# Patient Record
Sex: FEMALE | Race: WHITE | Employment: FULL TIME | URBAN - METROPOLITAN AREA
[De-identification: names, ages, dates, MRNs, and addresses within clinical notes are randomized per-mention and may not be internally consistent; named-entity substitution may affect disease eponyms.]

---

## 2017-02-28 ENCOUNTER — OFFICE VISIT (OUTPATIENT)
Dept: DERMATOLOGY | Age: 47
End: 2017-02-28

## 2017-02-28 DIAGNOSIS — D22.9 MULTIPLE NEVI: ICD-10-CM

## 2017-02-28 DIAGNOSIS — Z86.018 HISTORY OF DYSPLASTIC NEVUS: ICD-10-CM

## 2017-02-28 DIAGNOSIS — Z85.820 HISTORY OF MELANOMA: Primary | ICD-10-CM

## 2017-02-28 PROCEDURE — 99213 OFFICE O/P EST LOW 20 MIN: CPT | Performed by: DERMATOLOGY

## 2017-05-09 ENCOUNTER — EMPLOYEE WELLNESS (OUTPATIENT)
Dept: OTHER | Age: 47
End: 2017-05-09

## 2017-05-09 LAB
CHOLESTEROL, TOTAL: 202 MG/DL (ref 0–199)
GLUCOSE BLD-MCNC: 76 MG/DL (ref 70–99)
HDLC SERPL-MCNC: 57 MG/DL (ref 40–60)
LDL CHOLESTEROL CALCULATED: 129 MG/DL
TRIGL SERPL-MCNC: 78 MG/DL (ref 0–150)

## 2017-05-22 LAB
HPV COMMENT: NORMAL
HPV TYPE 16: NOT DETECTED
HPV TYPE 18: NOT DETECTED
HPVOH (OTHER TYPES): NOT DETECTED

## 2017-06-13 ENCOUNTER — HOSPITAL ENCOUNTER (OUTPATIENT)
Dept: MAMMOGRAPHY | Age: 47
Discharge: HOME | End: 2017-06-13
Attending: OBSTETRICS & GYNECOLOGY | Admitting: OBSTETRICS & GYNECOLOGY

## 2017-06-13 DIAGNOSIS — Z12.31 ENCOUNTER FOR SCREENING MAMMOGRAM FOR BREAST CANCER: ICD-10-CM

## 2017-06-26 ENCOUNTER — OFFICE VISIT (OUTPATIENT)
Dept: FAMILY MEDICINE CLINIC | Age: 47
End: 2017-06-26

## 2017-06-26 VITALS
HEIGHT: 62 IN | SYSTOLIC BLOOD PRESSURE: 112 MMHG | OXYGEN SATURATION: 98 % | WEIGHT: 176 LBS | HEART RATE: 64 BPM | DIASTOLIC BLOOD PRESSURE: 78 MMHG | BODY MASS INDEX: 32.39 KG/M2

## 2017-06-26 DIAGNOSIS — E04.1 THYROID NODULE: ICD-10-CM

## 2017-06-26 DIAGNOSIS — Z00.00 HEALTHCARE MAINTENANCE: Primary | ICD-10-CM

## 2017-06-26 PROCEDURE — 99396 PREV VISIT EST AGE 40-64: CPT | Performed by: FAMILY MEDICINE

## 2018-01-04 ENCOUNTER — OFFICE VISIT (OUTPATIENT)
Dept: FAMILY MEDICINE CLINIC | Age: 48
End: 2018-01-04

## 2018-01-04 VITALS
DIASTOLIC BLOOD PRESSURE: 80 MMHG | WEIGHT: 179.6 LBS | HEART RATE: 89 BPM | SYSTOLIC BLOOD PRESSURE: 130 MMHG | OXYGEN SATURATION: 97 % | HEIGHT: 63 IN | BODY MASS INDEX: 31.82 KG/M2

## 2018-01-04 DIAGNOSIS — E04.1 THYROID NODULE: ICD-10-CM

## 2018-01-04 DIAGNOSIS — Z00.00 PREVENTATIVE HEALTH CARE: Primary | ICD-10-CM

## 2018-01-04 DIAGNOSIS — R60.0 LOWER EXTREMITY EDEMA: ICD-10-CM

## 2018-01-04 PROCEDURE — 99396 PREV VISIT EST AGE 40-64: CPT | Performed by: FAMILY MEDICINE

## 2018-01-04 ASSESSMENT — PATIENT HEALTH QUESTIONNAIRE - PHQ9
SUM OF ALL RESPONSES TO PHQ9 QUESTIONS 1 & 2: 0
1. LITTLE INTEREST OR PLEASURE IN DOING THINGS: 0
SUM OF ALL RESPONSES TO PHQ QUESTIONS 1-9: 0
2. FEELING DOWN, DEPRESSED OR HOPELESS: 0

## 2018-01-04 ASSESSMENT — ENCOUNTER SYMPTOMS
CONSTIPATION: 0
DIARRHEA: 1
COUGH: 0
SHORTNESS OF BREATH: 0
BACK PAIN: 0
TROUBLE SWALLOWING: 0
CHEST TIGHTNESS: 0

## 2018-01-04 NOTE — PROGRESS NOTES
better in morning). Negative for chest pain. Gastrointestinal: Positive for diarrhea (occasional; see above). Negative for constipation. Abdominal pain: with stomach upset/diarrhea. Intermittent. Genitourinary: Negative for difficulty urinating and menstrual problem. Musculoskeletal: Negative for arthralgias and back pain. Neurological: Positive for headaches (occasional; has had some headache evaluation in the past with more severe migraine). Psychiatric/Behavioral: Negative for sleep disturbance. The patient is not nervous/anxious. Objective:    /80   Pulse 89   Ht 5' 2.5\" (1.588 m)   Wt 179 lb 9.6 oz (81.5 kg)   SpO2 97%   BMI 32.33 kg/m²   Weight: 179 lb 9.6 oz (81.5 kg)     BP Readings from Last 3 Encounters:   01/04/18 130/80   06/26/17 112/78   10/17/16 110/74     Wt Readings from Last 3 Encounters:   01/04/18 179 lb 9.6 oz (81.5 kg)   06/26/17 176 lb (79.8 kg)   10/17/16 170 lb (77.1 kg)       Physical Exam   Constitutional: She is oriented to person, place, and time. She appears well-developed and well-nourished. No distress. HENT:   Head: Normocephalic and atraumatic. Mouth/Throat: Posterior oropharyngeal edema and posterior oropharyngeal erythema present. Heavy cobblestoning   Eyes: Conjunctivae and EOM are normal. Pupils are equal, round, and reactive to light. Neck: Normal range of motion. Neck supple. Cardiovascular: Normal rate, regular rhythm and normal heart sounds. Pulmonary/Chest: Effort normal and breath sounds normal.   Abdominal: Soft. Bowel sounds are normal.   Musculoskeletal: Normal range of motion. Neurological: She is alert and oriented to person, place, and time. She has normal reflexes. Skin: Skin is warm and dry. No rash noted. She is not diaphoretic. Psychiatric: She has a normal mood and affect. Assessment/Plan:    1. Preventative health care  She is up to date with preventative care.  We discussed dietary changes; she has been

## 2018-01-04 NOTE — PATIENT INSTRUCTIONS
Patient Education        DASH Diet: Care Instructions  Your Care Instructions    The DASH diet is an eating plan that can help lower your blood pressure. DASH stands for Dietary Approaches to Stop Hypertension. Hypertension is high blood pressure. The DASH diet focuses on eating foods that are high in calcium, potassium, and magnesium. These nutrients can lower blood pressure. The foods that are highest in these nutrients are fruits, vegetables, low-fat dairy products, nuts, seeds, and legumes. But taking calcium, potassium, and magnesium supplements instead of eating foods that are high in those nutrients does not have the same effect. The DASH diet also includes whole grains, fish, and poultry. The DASH diet is one of several lifestyle changes your doctor may recommend to lower your high blood pressure. Your doctor may also want you to decrease the amount of sodium in your diet. Lowering sodium while following the DASH diet can lower blood pressure even further than just the DASH diet alone. Follow-up care is a key part of your treatment and safety. Be sure to make and go to all appointments, and call your doctor if you are having problems. It's also a good idea to know your test results and keep a list of the medicines you take. How can you care for yourself at home? Following the DASH diet  · Eat 4 to 5 servings of fruit each day. A serving is 1 medium-sized piece of fruit, ½ cup chopped or canned fruit, 1/4 cup dried fruit, or 4 ounces (½ cup) of fruit juice. Choose fruit more often than fruit juice. · Eat 4 to 5 servings of vegetables each day. A serving is 1 cup of lettuce or raw leafy vegetables, ½ cup of chopped or cooked vegetables, or 4 ounces (½ cup) of vegetable juice. Choose vegetables more often than vegetable juice. · Get 2 to 3 servings of low-fat and fat-free dairy each day. A serving is 8 ounces of milk, 1 cup of yogurt, or 1 ½ ounces of cheese. · Eat 6 to 8 servings of grains each day.

## 2018-02-27 ENCOUNTER — HOSPITAL ENCOUNTER (OUTPATIENT)
Dept: ULTRASOUND IMAGING | Age: 48
Discharge: HOME | End: 2018-02-27
Attending: FAMILY MEDICINE | Admitting: FAMILY MEDICINE

## 2018-02-27 DIAGNOSIS — E04.1 THYROID NODULE: ICD-10-CM

## 2018-02-27 DIAGNOSIS — E04.1 NONTOXIC SINGLE THYROID NODULE: ICD-10-CM

## 2018-03-05 ENCOUNTER — OFFICE VISIT (OUTPATIENT)
Dept: DERMATOLOGY | Age: 48
End: 2018-03-05

## 2018-03-05 DIAGNOSIS — Z86.018 HISTORY OF DYSPLASTIC NEVUS: ICD-10-CM

## 2018-03-05 DIAGNOSIS — D22.9 MULTIPLE NEVI: ICD-10-CM

## 2018-03-05 DIAGNOSIS — D23.39 FIBROUS PAPULE OF CHEEK: ICD-10-CM

## 2018-03-05 DIAGNOSIS — Z85.820 HISTORY OF MALIGNANT MELANOMA: Primary | ICD-10-CM

## 2018-03-05 PROCEDURE — 99213 OFFICE O/P EST LOW 20 MIN: CPT | Performed by: DERMATOLOGY

## 2018-03-20 VITALS — BODY MASS INDEX: 32.37 KG/M2 | WEIGHT: 177 LBS

## 2018-04-04 LAB
CHOLESTEROL, TOTAL: 216 MG/DL (ref 0–199)
GLUCOSE BLD-MCNC: 79 MG/DL (ref 70–99)
HDLC SERPL-MCNC: 58 MG/DL (ref 40–60)
LDL CHOLESTEROL CALCULATED: 139 MG/DL
TRIGL SERPL-MCNC: 97 MG/DL (ref 0–150)

## 2018-04-05 ENCOUNTER — EMPLOYEE WELLNESS (OUTPATIENT)
Dept: OTHER | Age: 48
End: 2018-04-05

## 2018-04-10 VITALS — WEIGHT: 184 LBS | BODY MASS INDEX: 33.12 KG/M2

## 2019-02-26 ENCOUNTER — HOSPITAL ENCOUNTER (OUTPATIENT)
Dept: WOMENS IMAGING | Age: 49
Discharge: HOME | End: 2019-02-26
Payer: COMMERCIAL

## 2019-02-26 DIAGNOSIS — Z12.39 BREAST CANCER SCREENING: ICD-10-CM

## 2019-02-26 PROCEDURE — 77063 BREAST TOMOSYNTHESIS BI: CPT

## 2019-03-05 ENCOUNTER — OFFICE VISIT (OUTPATIENT)
Dept: DERMATOLOGY | Age: 49
End: 2019-03-05
Payer: COMMERCIAL

## 2019-03-05 DIAGNOSIS — Z85.820 HISTORY OF MALIGNANT MELANOMA: Primary | ICD-10-CM

## 2019-03-05 DIAGNOSIS — D22.9 MULTIPLE NEVI: ICD-10-CM

## 2019-03-05 DIAGNOSIS — D48.5 NEOPLASM OF UNCERTAIN BEHAVIOR OF SKIN: ICD-10-CM

## 2019-03-05 DIAGNOSIS — Z86.018 HISTORY OF DYSPLASTIC NEVUS: ICD-10-CM

## 2019-03-05 PROCEDURE — 99213 OFFICE O/P EST LOW 20 MIN: CPT | Performed by: DERMATOLOGY

## 2019-03-05 PROCEDURE — 11102 TANGNTL BX SKIN SINGLE LES: CPT | Performed by: DERMATOLOGY

## 2019-03-05 RX ORDER — CHLORAL HYDRATE 500 MG
3000 CAPSULE ORAL 3 TIMES DAILY
COMMUNITY

## 2019-03-05 RX ORDER — CALCIUM CARBONATE 500(1250)
500 TABLET ORAL DAILY
COMMUNITY

## 2019-03-05 RX ORDER — CHOLECALCIFEROL (VITAMIN D3) 125 MCG
CAPSULE ORAL
COMMUNITY

## 2019-03-07 LAB — DERMATOLOGY PATHOLOGY REPORT: ABNORMAL

## 2019-03-08 ENCOUNTER — PATIENT MESSAGE (OUTPATIENT)
Dept: DERMATOLOGY | Age: 49
End: 2019-03-08

## 2019-03-08 DIAGNOSIS — C44.311 BASAL CELL CARCINOMA OF NOSE: Primary | ICD-10-CM

## 2019-03-25 ENCOUNTER — PROCEDURE VISIT (OUTPATIENT)
Dept: SURGERY | Age: 49
End: 2019-03-25
Payer: COMMERCIAL

## 2019-03-25 VITALS
HEART RATE: 75 BPM | TEMPERATURE: 98.6 F | DIASTOLIC BLOOD PRESSURE: 67 MMHG | BODY MASS INDEX: 32.76 KG/M2 | SYSTOLIC BLOOD PRESSURE: 104 MMHG | WEIGHT: 182 LBS

## 2019-03-25 DIAGNOSIS — C44.319 BASAL CELL CARCINOMA OF LEFT CHEEK: Primary | ICD-10-CM

## 2019-03-25 PROCEDURE — 17311 MOHS 1 STAGE H/N/HF/G: CPT | Performed by: DERMATOLOGY

## 2019-03-25 PROCEDURE — 13132 CMPLX RPR F/C/C/M/N/AX/G/H/F: CPT | Performed by: DERMATOLOGY

## 2019-03-26 ENCOUNTER — TELEPHONE (OUTPATIENT)
Dept: SURGERY | Age: 49
End: 2019-03-26

## 2019-04-04 ENCOUNTER — TELEPHONE (OUTPATIENT)
Dept: DERMATOLOGY | Age: 49
End: 2019-04-04

## 2019-04-04 NOTE — TELEPHONE ENCOUNTER
Spoke to patient - she actually says that the area looks better than today. Patient will watch the area and call at the end of the month if she still feels the area needs to be checked.

## 2019-05-01 ENCOUNTER — TELEPHONE (OUTPATIENT)
Dept: DERMATOLOGY | Age: 49
End: 2019-05-01

## 2019-05-01 NOTE — TELEPHONE ENCOUNTER
Pt c/b [phone]  Pt states:   - has a new spot   - similar in appearance to the bbc that was just removed   - on face cheek close to where the other one was removed   - would like to be seen  Please call to discuss thanks

## 2019-05-01 NOTE — TELEPHONE ENCOUNTER
Touched base with patient regarding a spot just to left to prev skin cancer site. Patient notified that no cancellations as of today but will call if availability opens up.

## 2019-05-22 ENCOUNTER — EMPLOYEE WELLNESS (OUTPATIENT)
Dept: OTHER | Age: 49
End: 2019-05-22

## 2019-05-22 LAB
CHOLESTEROL, TOTAL: 212 MG/DL (ref 0–199)
GLUCOSE BLD-MCNC: 92 MG/DL (ref 70–99)
HDLC SERPL-MCNC: 52 MG/DL (ref 40–60)
LDL CHOLESTEROL CALCULATED: 142 MG/DL
TRIGL SERPL-MCNC: 89 MG/DL (ref 0–150)

## 2019-05-28 VITALS — BODY MASS INDEX: 32.4 KG/M2 | WEIGHT: 180 LBS

## 2019-06-12 ENCOUNTER — OFFICE VISIT (OUTPATIENT)
Dept: DERMATOLOGY | Age: 49
End: 2019-06-12
Payer: COMMERCIAL

## 2019-06-12 DIAGNOSIS — D22.9 FIBROUS PAPULE OF SKIN: Primary | ICD-10-CM

## 2019-06-12 DIAGNOSIS — Z85.828 HISTORY OF NONMELANOMA SKIN CANCER: ICD-10-CM

## 2019-06-12 PROCEDURE — 99212 OFFICE O/P EST SF 10 MIN: CPT | Performed by: DERMATOLOGY

## 2019-06-12 NOTE — PROGRESS NOTES
[facility]  Governor MD Eliazar  [phone]      Darryle Sánchez  1970    51 y.o. Date of Visit: 2019    Chief Complaint: f/u melanoma, moles/lesions  Chief Complaint   Patient presents with    Skin Lesion     lt side nose crease     Last seen: 3-2019 for FSE    History of Present Illness:    1. Here for 2 lesions on the L cheek that she noticed in the past 2 mos since last seen. These are asx - no bleeding. But she is concerned about them b/c of her recent [facility] on the L NL fold. 2. S/p Mohs for [facility] on the L NL fold - 3-25-19. Healing well - scar is pink but fading - looks good. Hx of melanoma (see below). Had FSE 3-2019    She wears sunscreen regularly. She has been to [facility] multiple times. S/p excision of an enlarging asx lesion on L buttock at previous visit - read as fibrolipoma. It healed well. Her daughter was considering medical school and previously wanted to shadow but may not go to med school now. Dermatology History   , melanoma left post thigh, excised by plastic surgeon. No sentinel nodes done, no adjuvant therapy. 0.5 mm thick, og 2.     hx dysplastic nevi  No family hx melanoma. Review of Systems:  Gen: no F/C, night sweats, + weight loss but intentional  Neuro: no HA  Heme/Lymph: no LAD    Past Medical History, Family History, Surgical History, Medications and Allergies reviewed.     Social History:  Occupation:  RN at [employer]    Past Medical History:   Diagnosis Date    Cancer [facility])     Melanoma    History of melanoma     Thyroid nodule        Past Surgical History:   Procedure Laterality Date     SECTION      MOHS SURGERY  2019    Left nasolabial fold    PLANTAR FASCIA SURGERY Right 13    ENDOSCOPIC PLANTAR FASCIOTOMY RIGHT FOOT    SKIN CANCER DESTRUCTION      WISDOM TOOTH EXTRACTION  age [age]       Outpatient Medications Marked as Taking for the 19 encounter (Office Visit) with Aretha Torres MD [facility]   Medication Sig Dispense Refill    Cholecalciferol (VITAMIN D) 2000 units CAPS capsule Take by mouth      Omega-3 Fatty Acids (FISH OIL) 1000 MG CAPS Take 3,000 mg by mouth 3 times daily      calcium carbonate (OSCAL) 500 MG TABS tablet Take 500 mg by mouth daily      cycloSPORINE (RESTASIS) 0.05 % ophthalmic emulsion Place 1 drop into both eyes 2 times daily. No Known Allergies      Physical Examination     Gen, well-appearing  The following were examined and determined to be normal: Psych/Neuro, Conjunctivae/eyelids, Gums/teeth/lips  The following were examined and determined to be abnormal: face     medial upper cheek and L medial lower cheek with pinpoint pinkish- white papules  L NL fold linear scar with erythema and very mild telangiectasias            Assessment and Plan     1. L medial cheek upper and L medial cheek lower - milia vs FP  - reassured regarding benign appearance and attempted extraction with 11 blade with tiny keratin core removed  - recheck in the fall at f/u for FSE    2. Vbeam for the Mohs scar - 10 mm, 7.5 J, 10 ms, 30/20; no extra charge    F/u ~1 year, sooner prn.

## 2019-09-09 ENCOUNTER — OFFICE VISIT (OUTPATIENT)
Dept: DERMATOLOGY | Age: 49
End: 2019-09-09
Payer: COMMERCIAL

## 2019-09-09 DIAGNOSIS — Z86.018 HISTORY OF DYSPLASTIC NEVUS: ICD-10-CM

## 2019-09-09 DIAGNOSIS — Z85.820 HISTORY OF MALIGNANT MELANOMA: ICD-10-CM

## 2019-09-09 DIAGNOSIS — L29.9 SCALP PRURITUS: ICD-10-CM

## 2019-09-09 DIAGNOSIS — Z85.828 HISTORY OF NONMELANOMA SKIN CANCER: Primary | ICD-10-CM

## 2019-09-09 DIAGNOSIS — D22.9 MULTIPLE NEVI: ICD-10-CM

## 2019-09-09 PROCEDURE — 99214 OFFICE O/P EST MOD 30 MIN: CPT | Performed by: DERMATOLOGY

## 2019-09-09 NOTE — PROGRESS NOTES
03/25/2019    Left nasolabial fold    PLANTAR FASCIA SURGERY Right 5/24/13    ENDOSCOPIC PLANTAR FASCIOTOMY RIGHT FOOT    SKIN CANCER DESTRUCTION      WISDOM TOOTH EXTRACTION  age [age]       Outpatient Medications Marked as Taking for the 9/9/19 encounter (Office Visit) with Radha Toscano MD   Medication Sig Dispense Refill    Cholecalciferol (VITAMIN D) 2000 units CAPS capsule Take by mouth      Omega-3 Fatty Acids (FISH OIL) 1000 MG CAPS Take 3,000 mg by mouth 3 times daily      calcium carbonate (OSCAL) 500 MG TABS tablet Take 500 mg by mouth daily      cycloSPORINE (RESTASIS) 0.05 % ophthalmic emulsion Place 1 drop into both eyes 2 times daily. No Known Allergies      Physical Examination     Gen, well-appearing  The following were examined and determined to be normal: Psych/Neuro, Scalp/hair,  Conjunctivae/eyelids, Gums/teeth/lips, Neck, Breast/axilla/chest, Abdomen, Back, RUE, LUE, RLE, LLE, Nails/digits and Lymph. buttocks     The following were examined and determined to be abnormal: face  1. Scars clear; no LAD  2. trunk and extremities with scattered brown macules and papules   L Buttock with linear pink scar  3. L cheek clear except ? Pinpoint skin-colored papule  4. Scalp clear    Assessment and Plan     1. History of melanoma, no signs of recurrence  Hx of NMSC, no signs recurrence  - educ re ABCD's of MM   educ sun protection   encouraged skin check 6-12 mos (sooner if indicated), self checks monthly      2. Benign-appearing nevi and hx of dysplastic nevi  Ed as above     3. L medial cheek upper and L medial cheek lower - ? milia vs FP  - reassured regarding benign appearance/resolved    4. Scalp pruritus, ? Mild seb derm  - clear today  - ed tea tree oil shampoo  - scalpicin prn flares     F/u ~1 year, sooner prn.

## 2019-09-09 NOTE — PATIENT INSTRUCTIONS
Tea tree oil shampoo  scalpicin spray      Protecting Yourself From the Sun    · Apply broad spectrum water resistant sunscreen with an SPF of at least 30 to exposed areas of the skin. Dont forget the ears and lips! Remember to reapply sunscreen about every 2 hours and after swimming or sweating. · Wear sun protective clothing. Swim shirts (aka. rash guards) are a great idea and negates the need to reapply sunscreen in those areas.      · Seek the shade whenever possible especially between the hours of 10 am and 4 pm when the suns rays are the strongest.     · Avoid tanning beds

## 2020-03-30 ENCOUNTER — TELEPHONE (OUTPATIENT)
Dept: DERMATOLOGY | Age: 50
End: 2020-03-30

## 2020-03-30 NOTE — TELEPHONE ENCOUNTER
Patient called and wanted to cancel appointment for today. She would like to be scheduled when the office opens. Thanks!     Call back # [phone]

## 2020-06-08 ENCOUNTER — OFFICE VISIT (OUTPATIENT)
Dept: DERMATOLOGY | Age: 50
End: 2020-06-08
Payer: COMMERCIAL

## 2020-06-08 VITALS — TEMPERATURE: 97.1 F

## 2020-06-08 PROCEDURE — 99213 OFFICE O/P EST LOW 20 MIN: CPT | Performed by: DERMATOLOGY

## 2020-12-16 ENCOUNTER — OFFICE VISIT (OUTPATIENT)
Dept: PRIMARY CARE CLINIC | Age: 50
End: 2020-12-16
Payer: COMMERCIAL

## 2020-12-16 PROCEDURE — 99211 OFF/OP EST MAY X REQ PHY/QHP: CPT | Performed by: NURSE PRACTITIONER

## 2020-12-16 NOTE — PROGRESS NOTES
Haily Quintero received a viral test for COVID-19. They were educated on isolation and quarantine as appropriate. For any symptoms, they were directed to seek care from their PCP, given contact information to establish with a doctor, directed to an urgent care or the emergency room.

## 2020-12-16 NOTE — PATIENT INSTRUCTIONS
You have received a viral test for COVID-19. Below is education on quarantine per the CDC guidelines. For any symptoms, seek care from your PCP, call [phone] to establish care with a doctor, or go directly to an urgent care or the emergency room. Test results will take 2-7 days and will be sent to you in your [employer] account. If you test positive, you will be contacted via phone. If you test negative, the ONLY communication will be through [address] GO TO [employer] AND SIGN UP FOR [employer]  (LOWER LEFT OF THE HOME PAGE)  No test is 100%. If you have symptoms, you should follow the guidance of quarantine as previously stated. You can still be contagious if you have symptoms. Your [facility] will reach out to you if you have a positive result. They will provide you with a return to work date and note. If you were tested for a pre-op, then you should remain in quarantine until your procedure. How do I know if I need to be in quarantine? If you live in a community where COVID-19 is or might be spreading (currently, that is virtually everywhere in the [place])  Be alert for symptoms. Watch for fever, cough, shortness of breath, or other symptoms of COVID-19.  ? Take your temperature if symptoms develop. ? Practice social distancing. Maintain 6 feet of distance from others and stay out of crowded places. ? Follow CDC guidance if symptoms develop. If you feel healthy but:  ? Recently had close contact with a person with COVID-19 you need to Quarantine:  ? Stay home until 14 days after your last exposure. ? Check your temperature twice a day and watch for symptoms of COVID-19.  ? If possible, stay away from people who are at higher-risk for getting very sick from COVID-19. Stay Home and Monitor Your Health if you:  ? Have been diagnosed with COVID-19, or  ? Are waiting for test results, or  ?  Have cough, fever, or shortness of breath, or symptoms of COVID-19 When You Can be Around Others After You Had or Likely Had COVID-19     If you have or think you might have COVID-19, it is important to stay home and away from other people. Staying away from others helps stop the spread of COVID-19. If you have an emergency warning sign (including trouble breathing), get emergency medical care immediately. When you can be around others (end home isolation) depends on different factors for different situations. Find CDC's recommendations for your situation below. I think or know I had COVID-19, and I had symptoms  You can be with others after  ? 3 days with no fever and  ? Respiratory symptoms have improved (e.g. cough, shortness of breath) and  ? 10 days since symptoms first appeared  Depending on your healthcare provider's advice and availability of testing, you might get tested to see if you still have COVID-19. If you will be tested, you can be around others when you have no fever, respiratory symptoms have improved, and you receive two negative test results in a row, at least 24 hours apart. I tested positive for COVID-19 but had no symptoms  If you continue to have no symptoms, you can be with others after:  ? 10 days have passed since test or 14 days since your exposure test   Depending on your healthcare provider's advice and availability of testing, you might get tested to see if you still have COVID-19. If you will be tested, you can be around others after you receive two negative test results in a row, at least 24 hours apart. If you develop symptoms after testing positive, follow the guidance above for I think or know I had COVID, and I had symptoms.   For Anyone Who Has Been Around a Person with COVID-19  It is important to remember that anyone who has close contact with someone with COVID-19 should stay home for 14 days after exposure based on the time it takes to develop illness. Testing is not necessary.     www.cdc.gov/coronavirus/2019-ncov/index.html

## 2020-12-17 LAB — SARS-COV-2, NAA: NOT DETECTED

## 2021-01-12 ENCOUNTER — OFFICE VISIT (OUTPATIENT)
Dept: INTERNAL MEDICINE CLINIC | Age: 51
End: 2021-01-12
Payer: COMMERCIAL

## 2021-01-12 VITALS
OXYGEN SATURATION: 97 % | BODY MASS INDEX: 32.82 KG/M2 | DIASTOLIC BLOOD PRESSURE: 76 MMHG | WEIGHT: 185.2 LBS | TEMPERATURE: 97.2 F | HEART RATE: 95 BPM | HEIGHT: 63 IN | SYSTOLIC BLOOD PRESSURE: 126 MMHG | RESPIRATION RATE: 14 BRPM

## 2021-01-12 DIAGNOSIS — Z13.6 ENCOUNTER FOR LIPID SCREENING FOR CARDIOVASCULAR DISEASE: ICD-10-CM

## 2021-01-12 DIAGNOSIS — Z00.00 ANNUAL PHYSICAL EXAM: Primary | ICD-10-CM

## 2021-01-12 DIAGNOSIS — Z11.59 NEED FOR HEPATITIS C SCREENING TEST: ICD-10-CM

## 2021-01-12 DIAGNOSIS — E04.1 THYROID NODULE: ICD-10-CM

## 2021-01-12 DIAGNOSIS — Z13.220 ENCOUNTER FOR LIPID SCREENING FOR CARDIOVASCULAR DISEASE: ICD-10-CM

## 2021-01-12 DIAGNOSIS — E55.9 VITAMIN D DEFICIENCY: ICD-10-CM

## 2021-01-12 DIAGNOSIS — Z13.1 SCREENING FOR DIABETES MELLITUS (DM): ICD-10-CM

## 2021-01-12 PROCEDURE — 99386 PREV VISIT NEW AGE 40-64: CPT | Performed by: INTERNAL MEDICINE

## 2021-01-12 SDOH — ECONOMIC STABILITY: INCOME INSECURITY: HOW HARD IS IT FOR YOU TO PAY FOR THE VERY BASICS LIKE FOOD, HOUSING, MEDICAL CARE, AND HEATING?: NOT ASKED

## 2021-01-12 SDOH — ECONOMIC STABILITY: TRANSPORTATION INSECURITY
IN THE PAST 12 MONTHS, HAS THE LACK OF TRANSPORTATION KEPT YOU FROM MEDICAL APPOINTMENTS OR FROM GETTING MEDICATIONS?: NOT ASKED

## 2021-01-12 SDOH — ECONOMIC STABILITY: FOOD INSECURITY: WITHIN THE PAST 12 MONTHS, THE FOOD YOU BOUGHT JUST DIDN'T LAST AND YOU DIDN'T HAVE MONEY TO GET MORE.: NOT ASKED

## 2021-01-12 ASSESSMENT — ENCOUNTER SYMPTOMS
COLOR CHANGE: 0
COUGH: 0
EYE PAIN: 0
PHOTOPHOBIA: 0
BACK PAIN: 0
WHEEZING: 0
TROUBLE SWALLOWING: 0
SHORTNESS OF BREATH: 0
NAUSEA: 0
ABDOMINAL PAIN: 0
SINUS PAIN: 0
CONSTIPATION: 1
DIARRHEA: 0
SINUS PRESSURE: 0
VOMITING: 0

## 2021-01-12 ASSESSMENT — PATIENT HEALTH QUESTIONNAIRE - PHQ9
2. FEELING DOWN, DEPRESSED OR HOPELESS: 0
SUM OF ALL RESPONSES TO PHQ QUESTIONS 1-9: 0
1. LITTLE INTEREST OR PLEASURE IN DOING THINGS: 0
SUM OF ALL RESPONSES TO PHQ QUESTIONS 1-9: 0
SUM OF ALL RESPONSES TO PHQ9 QUESTIONS 1 & 2: 0

## 2021-01-12 NOTE — PROGRESS NOTES
2021    Iggy Falcon (:  1970) is a 45 y.o. female, here for evaluation of the following medical concerns:    HPI  New patient CPE -     Melanoma back of thigh. Sees [facility] q6months. BCC on face - had Mohs. [place] in [place]. Has a thyroid nodule No prior biopsy. No difficulty swallowing or compressive symptoms. Noted incidentally. -   2018 [place]  HISTORY:   ORDERING PHYSICIAN PROVIDED HISTORY: Thyroid nodule   TECHNOLOGIST PROVIDED HISTORY:   Technologist Provided Reason for Exam: nodule   Acuity: Unknown   Type of Encounter: Subsequent/Follow-up   Additional signs and symptoms: n/a   Relevant Medical/Surgical History: n/a       FINDINGS:   Right thyroid lobe:  4.8 x 1.5 x 1.6 cm       Left thyroid lobe:  4.1 x 1.2 x 1.6 cm       Isthmus:  3 mm       Thyroid Gland:  Thyroid gland demonstrates normal echotexture and vascularity.       Nodules: Inferior aspect of the right thyroid lobe demonstrates a   solid-appearing 8 mm x 6 mm x 7 mm sized nodule.  This nodule appears round   and isoechoic to the thyroid tissue.  It demonstrates flow.  Previously seen   right thyroid lobe probable colloid cysts is not visualized currently.       Cervical lymphadenopathy: No abnormal lymph nodes in the imaged portions of   the neck.           Impression   No significant interval change appreciated in the subcentimeter   solid-appearing right thyroid lobe nodule. LMP - Dec 2019. Review of Systems   Constitutional: Negative for appetite change, fatigue, fever and unexpected weight change. No night sweats   HENT: Negative for congestion, ear pain, hearing loss, nosebleeds, sinus pressure, sinus pain, sneezing, tinnitus and trouble swallowing. Eyes: Negative for photophobia, pain and visual disturbance. Respiratory: Negative for cough, shortness of breath and wheezing. Cardiovascular: Negative for chest pain, palpitations and leg swelling. Gastrointestinal: Positive for constipation. Negative for abdominal pain, diarrhea, nausea and vomiting. Endocrine: Negative for cold intolerance, heat intolerance, polydipsia, polyphagia and polyuria. Genitourinary: Negative for difficulty urinating, dysuria, frequency, hematuria and urgency. Musculoskeletal: Positive for arthralgias (left foot pain). Negative for back pain, joint swelling, myalgias and neck pain. Skin: Negative for color change and rash. Allergic/Immunologic: Negative for environmental allergies, food allergies and immunocompromised state. Neurological: Negative for dizziness, syncope, speech difficulty, weakness, light-headedness, numbness and headaches. Hematological: Negative for adenopathy. Does not bruise/bleed easily. Psychiatric/Behavioral: Negative for dysphoric mood and sleep disturbance. The patient is not nervous/anxious. Prior to Visit Medications    Medication Sig Taking? Authorizing Provider   Cholecalciferol (VITAMIN D) 125 MCG (5000 UT) CAPS Take by mouth  Yes Historical Provider, MD   Omega-3 Fatty Acids (FISH OIL) 1000 MG CAPS Take 3,000 mg by mouth 3 times daily Yes Historical Provider, MD   calcium carbonate (OSCAL) 500 MG TABS tablet Take 500 mg by mouth daily Yes Historical Provider, MD   cycloSPORINE (RESTASIS) 0.05 % ophthalmic emulsion Place 1 drop into both eyes 2 times daily.    Yes Historical Provider, MD        No Known Allergies    Past Medical History:   Diagnosis Date    Cancer ([facility] [address]     Melanoma    History of melanoma     Thyroid nodule        Past Surgical History:   Procedure Laterality Date     SECTION      MOHS SURGERY  2019    Left nasolabial fold    PLANTAR FASCIA SURGERY Right 13    ENDOSCOPIC PLANTAR FASCIOTOMY RIGHT FOOT    SKIN CANCER DESTRUCTION      WISDOM TOOTH EXTRACTION  age [age]       Social History     Socioeconomic History    Marital status:      Spouse name: Not on file Physical Exam  Constitutional:       General: She is not in acute distress. Appearance: Normal appearance. She is well-developed. She is not diaphoretic. HENT:      Head: Normocephalic and atraumatic. Right Ear: Tympanic membrane, ear canal and external ear normal.      Left Ear: Tympanic membrane, ear canal and external ear normal.      Nose: Nose normal.      Mouth/Throat:      Mouth: Mucous membranes are moist.      Pharynx: Oropharynx is clear. No oropharyngeal exudate or posterior oropharyngeal erythema. Eyes:      General: No scleral icterus. Conjunctiva/sclera: Conjunctivae normal.      Pupils: Pupils are equal, round, and reactive to light. Neck:      Musculoskeletal: Normal range of motion and neck supple. Thyroid: No thyromegaly. Vascular: No carotid bruit or JVD. Comments: No JVD at 90 deg  Right sided thyroid nodule noted  Cardiovascular:      Rate and Rhythm: Normal rate and regular rhythm. Pulses: Normal pulses. Heart sounds: Normal heart sounds. No murmur. No friction rub. No gallop. Pulmonary:      Effort: Pulmonary effort is normal.      Breath sounds: Normal breath sounds. No wheezing or rales. Abdominal:      General: Bowel sounds are normal. There is no distension. Palpations: Abdomen is soft. There is no mass. Tenderness: There is no abdominal tenderness. There is no guarding or rebound. Musculoskeletal: Normal range of motion. General: Tenderness (TTP 3rd and 4tf MT heads on plantar aspect of left foot) present. No deformity. Right lower leg: No edema. Left lower leg: No edema. Lymphadenopathy:      Cervical: No cervical adenopathy. Skin:     General: Skin is warm and dry. Findings: No lesion or rash. Neurological:      General: No focal deficit present. Mental Status: She is alert and oriented to person, place, and time. Cranial Nerves: No cranial nerve deficit (grossly intact). Sensory: No sensory deficit. Motor: No weakness. Coordination: Coordination normal.      Gait: Gait normal.   Psychiatric:         Mood and Affect: Mood normal.         Behavior: Behavior normal.         ASSESSMENT/PLAN:  Sherrie Butler was seen today for established new doctor. Diagnoses and all orders for this visit:    Annual physical exam  Care gaps identified and addressed  Will reschedule colonoscopy  US thyroid for nodule monitoring. Podiatry for foot pain - metatarsal pads, NSAIDs, supportive footwear for now. Sun protection - follows with derm. Sees gyn  She will call to schedule mammo  -     TSH with Reflex; Future  -     Cancel: Glucose, Fasting; Future  -     CBC Auto Differential; Future  -     COMPREHENSIVE METABOLIC PANEL; Future    Thyroid nodule  -     US THYROID; Future  -     TSH with Reflex; Future    Encounter for lipid screening for cardiovascular disease  -     Lipid Panel; Future    Screening for diabetes mellitus (DM)  -     Cancel: Glucose, Fasting; Future  -     COMPREHENSIVE METABOLIC PANEL; Future    Vitamin D deficiency  -     Vitamin D 25 Hydroxy; Future    Need for hepatitis C screening test  -     HEPATITIS C ANTIBODY; Future          Return in about 1 year (around 1/12/2022) for Physical.    An  electronic signature was used to authenticate this note.     --Mitch Cherry MD on 1/12/2021 at 11:24 AM

## 2021-02-26 DIAGNOSIS — Z11.59 NEED FOR HEPATITIS C SCREENING TEST: ICD-10-CM

## 2021-02-26 DIAGNOSIS — E55.9 VITAMIN D DEFICIENCY: ICD-10-CM

## 2021-02-26 DIAGNOSIS — Z13.220 ENCOUNTER FOR LIPID SCREENING FOR CARDIOVASCULAR DISEASE: ICD-10-CM

## 2021-02-26 DIAGNOSIS — Z00.00 ANNUAL PHYSICAL EXAM: ICD-10-CM

## 2021-02-26 DIAGNOSIS — E04.1 THYROID NODULE: ICD-10-CM

## 2021-02-26 DIAGNOSIS — Z13.6 ENCOUNTER FOR LIPID SCREENING FOR CARDIOVASCULAR DISEASE: ICD-10-CM

## 2021-02-26 DIAGNOSIS — Z13.1 SCREENING FOR DIABETES MELLITUS (DM): ICD-10-CM

## 2021-02-26 LAB
A/G RATIO: 1.8 (ref 1.1–2.2)
ALBUMIN SERPL-MCNC: 4.2 G/DL (ref 3.4–5)
ALP BLD-CCNC: 66 U/L (ref 40–129)
ALT SERPL-CCNC: 13 U/L (ref 10–40)
ANION GAP SERPL CALCULATED.3IONS-SCNC: 10 MMOL/L (ref 3–16)
AST SERPL-CCNC: 14 U/L (ref 15–37)
BASOPHILS ABSOLUTE: 0 K/UL (ref 0–0.2)
BASOPHILS RELATIVE PERCENT: 0.5 %
BILIRUB SERPL-MCNC: 0.4 MG/DL (ref 0–1)
BUN BLDV-MCNC: 12 MG/DL (ref 7–20)
CALCIUM SERPL-MCNC: 9.4 MG/DL (ref 8.3–10.6)
CHLORIDE BLD-SCNC: 104 MMOL/L (ref 99–110)
CHOLESTEROL, TOTAL: 213 MG/DL (ref 0–199)
CO2: 27 MMOL/L (ref 21–32)
CREAT SERPL-MCNC: 0.7 MG/DL (ref 0.6–1.1)
EOSINOPHILS ABSOLUTE: 0.1 K/UL (ref 0–0.6)
EOSINOPHILS RELATIVE PERCENT: 1.6 %
GFR AFRICAN AMERICAN: >60
GFR NON-AFRICAN AMERICAN: >60
GLOBULIN: 2.4 G/DL
GLUCOSE BLD-MCNC: 87 MG/DL (ref 70–99)
HCT VFR BLD CALC: 39.5 % (ref 36–48)
HDLC SERPL-MCNC: 55 MG/DL (ref 40–60)
HEMOGLOBIN: 13.4 G/DL (ref 12–16)
HEPATITIS C ANTIBODY INTERPRETATION: NORMAL
LDL CHOLESTEROL CALCULATED: 128 MG/DL
LYMPHOCYTES ABSOLUTE: 1.4 K/UL (ref 1–5.1)
LYMPHOCYTES RELATIVE PERCENT: 30.7 %
MCH RBC QN AUTO: 30.1 PG (ref 26–34)
MCHC RBC AUTO-ENTMCNC: 33.9 G/DL (ref 31–36)
MCV RBC AUTO: 88.8 FL (ref 80–100)
MONOCYTES ABSOLUTE: 0.3 K/UL (ref 0–1.3)
MONOCYTES RELATIVE PERCENT: 7.6 %
NEUTROPHILS ABSOLUTE: 2.7 K/UL (ref 1.7–7.7)
NEUTROPHILS RELATIVE PERCENT: 59.6 %
PDW BLD-RTO: 13.2 % (ref 12.4–15.4)
PLATELET # BLD: 261 K/UL (ref 135–450)
PMV BLD AUTO: 8.2 FL (ref 5–10.5)
POTASSIUM SERPL-SCNC: 4.1 MMOL/L (ref 3.5–5.1)
RBC # BLD: 4.45 M/UL (ref 4–5.2)
SODIUM BLD-SCNC: 141 MMOL/L (ref 136–145)
TOTAL PROTEIN: 6.6 G/DL (ref 6.4–8.2)
TRIGL SERPL-MCNC: 152 MG/DL (ref 0–150)
TSH REFLEX: 1.95 UIU/ML (ref 0.27–4.2)
VITAMIN D 25-HYDROXY: 49.2 NG/ML
VLDLC SERPL CALC-MCNC: 30 MG/DL
WBC # BLD: 4.6 K/UL (ref 4–11)

## 2021-02-27 ENCOUNTER — HOSPITAL ENCOUNTER (OUTPATIENT)
Dept: WOMENS IMAGING | Age: 51
Discharge: HOME | End: 2021-02-27
Payer: COMMERCIAL

## 2021-02-27 DIAGNOSIS — Z12.31 ENCOUNTER FOR SCREENING MAMMOGRAM FOR BREAST CANCER: ICD-10-CM

## 2021-02-27 PROCEDURE — 77063 BREAST TOMOSYNTHESIS BI: CPT

## 2021-03-17 ENCOUNTER — HOSPITAL ENCOUNTER (OUTPATIENT)
Dept: ULTRASOUND IMAGING | Age: 51
Discharge: HOME | End: 2021-03-17
Payer: COMMERCIAL

## 2021-03-17 ENCOUNTER — HOSPITAL ENCOUNTER (OUTPATIENT)
Dept: WOMENS IMAGING | Age: 51
Discharge: HOME | End: 2021-03-17
Payer: COMMERCIAL

## 2021-03-17 DIAGNOSIS — E04.1 THYROID NODULE: ICD-10-CM

## 2021-03-17 DIAGNOSIS — R92.8 ABNORMAL MAMMOGRAM: ICD-10-CM

## 2021-03-17 PROCEDURE — G0279 TOMOSYNTHESIS, MAMMO: HCPCS

## 2021-03-17 PROCEDURE — 76642 ULTRASOUND BREAST LIMITED: CPT

## 2021-03-17 PROCEDURE — 76536 US EXAM OF HEAD AND NECK: CPT

## 2021-03-23 ENCOUNTER — OFFICE VISIT (OUTPATIENT)
Dept: DERMATOLOGY | Age: 51
End: 2021-03-23
Payer: COMMERCIAL

## 2021-03-23 VITALS — TEMPERATURE: 97 F

## 2021-03-23 DIAGNOSIS — Z85.820 HISTORY OF MALIGNANT MELANOMA: ICD-10-CM

## 2021-03-23 DIAGNOSIS — Z86.018 HISTORY OF DYSPLASTIC NEVUS: ICD-10-CM

## 2021-03-23 DIAGNOSIS — L30.9 DERMATITIS: ICD-10-CM

## 2021-03-23 DIAGNOSIS — D22.9 MULTIPLE NEVI: ICD-10-CM

## 2021-03-23 DIAGNOSIS — L21.9 SEBORRHEIC DERMATITIS: ICD-10-CM

## 2021-03-23 DIAGNOSIS — Z85.828 HISTORY OF NONMELANOMA SKIN CANCER: Primary | ICD-10-CM

## 2021-03-23 PROCEDURE — 99214 OFFICE O/P EST MOD 30 MIN: CPT | Performed by: DERMATOLOGY

## 2021-03-23 RX ORDER — CLOBETASOL PROPIONATE 0.46 MG/ML
SOLUTION TOPICAL
Qty: 50 ML | Refills: 2 | Status: SHIPPED | OUTPATIENT
Start: 2021-03-23 | End: 2022-03-24

## 2021-03-23 NOTE — PROGRESS NOTES
[facility]  Vaibhav Bradshaw MD  [phone]      Vicky Wesley  1970    45 y.o. Date of Visit: 3/23/2021    Chief Complaint: f/u melanoma, moles/lesions  Chief Complaint   Patient presents with    Skin Lesion     Last seen:   *her daughter Chun Edward got engaged in     History of Present Illness:    1a. Hx of melanoma (see below) - no other new c/o; no HA, F/C, no NS, no weight loss, no LAD. Here for full skin check. 1b. Hx of NMSC - s/p Mohs for BCC on the L NL fold - 3-25-19. No signs recurrence. 2. She has multiple nevi - she is here for a full body skin check. She denies having any new lesions. No change in s/s/c in her moles. 3. Notes dry path on the L upper eyelid x several weeks. Slightly itchy. No trx tried. 4. Hx of scalp pruritus. No scale/eruption. Scaly spot on the posterior hairline recently - somewhat itchy. No trx tried. She wears sunscreen regularly. She has gone to [employer] multiple times - careful about sun. S/p excision of an enlarging asx lesion on L buttock at previous visit - read as fibrolipoma. It healed well. Dermatology History   , melanoma left post thigh, excised by plastic surgeon. No sentinel nodes done, no adjuvant therapy. 0.5 mm thick, og 2.     *S/p Mohs for BCC on the L NL fold - 3-25-19    hx dysplastic nevi  No family hx melanoma. Review of Systems:  Gen: no F/C, night sweats, + weight loss previously but intentional  Neuro: no HA  Heme/Lymph: no LAD    Past Medical History, Family History, Surgical History, Medications and Allergies reviewed.     Social History:  Occupation:  RN at [employer]    Past Medical History:   Diagnosis Date    Cancer [facility])     Melanoma    History of melanoma     Thyroid nodule        Past Surgical History:   Procedure Laterality Date     SECTION      MOHS SURGERY  2019    Left nasolabial fold    PLANTAR FASCIA SURGERY Right 13    ENDOSCOPIC PLANTAR FASCIOTOMY RIGHT FOOT    SKIN CANCER DESTRUCTION      WISDOM TOOTH EXTRACTION  age [age]       Outpatient Medications Marked as Taking for the 3/23/21 encounter (Office Visit) with Jeffry Wright MD   Medication Sig Dispense Refill    Cholecalciferol (VITAMIN D) 125 MCG (5000 UT) CAPS Take by mouth       Omega-3 Fatty Acids (FISH OIL) 1000 MG CAPS Take 3,000 mg by mouth 3 times daily      calcium carbonate (OSCAL) 500 MG TABS tablet Take 500 mg by mouth daily      cycloSPORINE (RESTASIS) 0.05 % ophthalmic emulsion Place 1 drop into both eyes 2 times daily. No Known Allergies      Physical Examination     Gen, well-appearing  The following were examined and determined to be normal: Psych/Neuro, Scalp/hair,  Conjunctivae/eyelids, Gums/teeth/lips, Neck, Breast/axilla/chest, Abdomen, Back, RUE, LUE, RLE, LLE, Nails/digits and Lymph. buttocks     The following were examined and determined to be abnormal: face  1. Scars clear; no LAD  2. trunk and extremities with scattered brown macules and papules   L Buttock with linear pink scar  3. L upper eyelid with il-defined finely scaly dry minimally erythematous patch  4. Occipital scalp with scaly pink 1-2 cm patch    Assessment and Plan     1. History of melanoma, no signs of recurrence  Hx of NMSC, no signs recurrence  - educ re si/sx/ABCD's of MM   educ sun protection - OTC sunscreen with SPF 30-50+ recommended and reviewed usage  encouraged skin check yearly (sooner if indicated), self checks     2. Benign-appearing nevi and hx of dysplastic nevi  Ed as above       3. L upper eyelid - dry patch c/w dermatitis, mild  - HC 2.5 oint daily - bid until clear;e d se/misuse and not for long-term trx    4. Scalp pruritus with mild seb derm - mild flare and focal today  - previously rec tea tree oil shampoo  - add clobetasol soln prn flares; ed se/misuse         F/u 6-12 year, sooner prn.

## 2021-06-21 ENCOUNTER — PATIENT MESSAGE (OUTPATIENT)
Dept: DERMATOLOGY | Age: 51
End: 2021-06-21

## 2021-06-21 NOTE — TELEPHONE ENCOUNTER
From: Roz Turpin  To: Janey Lindsay MD  Sent: 6/21/2021 8:59 AM EDT  Subject: Non-Urgent Medical Question    I'm concerned about the solid, whitish bump on my chin near my lips. It stared about the time of my last visit and has not gone away. I feel like it is growing but have no way to know for sure. It's hard to get a good photo but attached 2. I have appt Aug 2. Is it okay to wait until then to be seen? I really want it biopsied considering my history. Thanks!

## 2021-06-22 ENCOUNTER — PATIENT MESSAGE (OUTPATIENT)
Dept: DERMATOLOGY | Age: 51
End: 2021-06-22

## 2021-06-22 NOTE — TELEPHONE ENCOUNTER
It is hard to tell for sure from the photos. If it is not particularly painful and does not seem to be quickly growing, she is probably okay to wait until August but if she would like to come sooner, will you see if we have an opening to get her moved up?

## 2021-06-22 NOTE — TELEPHONE ENCOUNTER
From: Susana Apt  To: Calvin Barreto MD  Sent: 6/22/2021 1:32 PM EDT  Subject: Non-Urgent Medical Question    Never mind. I managed to get an appointment for next week. Thanks.

## 2021-06-25 LAB
CHOLESTEROL, TOTAL: 204 MG/DL (ref 0–199)
GLUCOSE BLD-MCNC: 84 MG/DL (ref 70–99)
HDLC SERPL-MCNC: 46 MG/DL (ref 40–60)
LDL CHOLESTEROL CALCULATED: 133 MG/DL
TRIGL SERPL-MCNC: 127 MG/DL (ref 0–150)

## 2021-08-30 ENCOUNTER — ANESTHESIA EVENT (OUTPATIENT)
Dept: ENDOSCOPY | Age: 51
End: 2021-08-30
Payer: COMMERCIAL

## 2021-08-31 ENCOUNTER — HOSPITAL ENCOUNTER (OUTPATIENT)
Age: 51
Setting detail: OUTPATIENT SURGERY
Discharge: HOME | End: 2021-08-31
Attending: INTERNAL MEDICINE | Admitting: INTERNAL MEDICINE
Payer: COMMERCIAL

## 2021-08-31 ENCOUNTER — ANESTHESIA (OUTPATIENT)
Dept: ENDOSCOPY | Age: 51
End: 2021-08-31
Payer: COMMERCIAL

## 2021-08-31 VITALS
TEMPERATURE: 96.2 F | SYSTOLIC BLOOD PRESSURE: 106 MMHG | OXYGEN SATURATION: 100 % | DIASTOLIC BLOOD PRESSURE: 65 MMHG | RESPIRATION RATE: 16 BRPM | HEART RATE: 79 BPM

## 2021-08-31 VITALS — SYSTOLIC BLOOD PRESSURE: 111 MMHG | DIASTOLIC BLOOD PRESSURE: 59 MMHG | OXYGEN SATURATION: 96 %

## 2021-08-31 DIAGNOSIS — Z12.11 COLON CANCER SCREENING: ICD-10-CM

## 2021-08-31 PROCEDURE — 2709999900 HC NON-CHARGEABLE SUPPLY: Performed by: INTERNAL MEDICINE

## 2021-08-31 PROCEDURE — 3700000000 HC ANESTHESIA ATTENDED CARE: Performed by: INTERNAL MEDICINE

## 2021-08-31 PROCEDURE — 7100000010 HC PHASE II RECOVERY - FIRST 15 MIN: Performed by: INTERNAL MEDICINE

## 2021-08-31 PROCEDURE — 6360000002 HC RX W HCPCS: Performed by: NURSE ANESTHETIST, CERTIFIED REGISTERED

## 2021-08-31 PROCEDURE — 7100000011 HC PHASE II RECOVERY - ADDTL 15 MIN: Performed by: INTERNAL MEDICINE

## 2021-08-31 PROCEDURE — 2580000003 HC RX 258: Performed by: ANESTHESIOLOGY

## 2021-08-31 PROCEDURE — 3609010600 HC COLONOSCOPY POLYPECTOMY SNARE/COLD BIOPSY: Performed by: INTERNAL MEDICINE

## 2021-08-31 PROCEDURE — 3700000001 HC ADD 15 MINUTES (ANESTHESIA): Performed by: INTERNAL MEDICINE

## 2021-08-31 PROCEDURE — 88305 TISSUE EXAM BY PATHOLOGIST: CPT

## 2021-08-31 RX ORDER — LIDOCAINE HYDROCHLORIDE 20 MG/ML
INJECTION, SOLUTION INTRAVENOUS PRN
Status: DISCONTINUED | OUTPATIENT
Start: 2021-08-31 | End: 2021-08-31 | Stop reason: SDUPTHER

## 2021-08-31 RX ORDER — SODIUM CHLORIDE, SODIUM LACTATE, POTASSIUM CHLORIDE, CALCIUM CHLORIDE 600; 310; 30; 20 MG/100ML; MG/100ML; MG/100ML; MG/100ML
INJECTION, SOLUTION INTRAVENOUS CONTINUOUS
Status: DISCONTINUED | OUTPATIENT
Start: 2021-08-31 | End: 2021-08-31 | Stop reason: HOSPADM

## 2021-08-31 RX ORDER — PROPOFOL 10 MG/ML
INJECTION, EMULSION INTRAVENOUS PRN
Status: DISCONTINUED | OUTPATIENT
Start: 2021-08-31 | End: 2021-08-31 | Stop reason: SDUPTHER

## 2021-08-31 RX ORDER — PROPOFOL 10 MG/ML
INJECTION, EMULSION INTRAVENOUS CONTINUOUS PRN
Status: DISCONTINUED | OUTPATIENT
Start: 2021-08-31 | End: 2021-08-31 | Stop reason: SDUPTHER

## 2021-08-31 RX ADMIN — LIDOCAINE HYDROCHLORIDE 50 MG: 20 INJECTION, SOLUTION INTRAVENOUS at 07:41

## 2021-08-31 RX ADMIN — PROPOFOL 35 MG: 10 INJECTION, EMULSION INTRAVENOUS at 07:53

## 2021-08-31 RX ADMIN — LIDOCAINE HYDROCHLORIDE 20 MG: 20 INJECTION, SOLUTION INTRAVENOUS at 07:46

## 2021-08-31 RX ADMIN — PROPOFOL 40 MG: 10 INJECTION, EMULSION INTRAVENOUS at 07:46

## 2021-08-31 RX ADMIN — PROPOFOL 125 MCG/KG/MIN: 10 INJECTION, EMULSION INTRAVENOUS at 07:41

## 2021-08-31 RX ADMIN — PROPOFOL 75 MG: 10 INJECTION, EMULSION INTRAVENOUS at 07:41

## 2021-08-31 RX ADMIN — LIDOCAINE HYDROCHLORIDE 30 MG: 20 INJECTION, SOLUTION INTRAVENOUS at 07:53

## 2021-08-31 RX ADMIN — SODIUM CHLORIDE, POTASSIUM CHLORIDE, SODIUM LACTATE AND CALCIUM CHLORIDE: 600; 310; 30; 20 INJECTION, SOLUTION INTRAVENOUS at 07:05

## 2021-08-31 ASSESSMENT — PULMONARY FUNCTION TESTS
PIF_VALUE: 0
PIF_VALUE: 0
PIF_VALUE: 1
PIF_VALUE: 0
PIF_VALUE: 1
PIF_VALUE: 0
PIF_VALUE: 1
PIF_VALUE: 1
PIF_VALUE: 0
PIF_VALUE: 1
PIF_VALUE: 1
PIF_VALUE: 0
PIF_VALUE: 1
PIF_VALUE: 0
PIF_VALUE: 0
PIF_VALUE: 1
PIF_VALUE: 0
PIF_VALUE: 1
PIF_VALUE: 1

## 2021-08-31 ASSESSMENT — PAIN SCALES - WONG BAKER
WONGBAKER_NUMERICALRESPONSE: 0
WONGBAKER_NUMERICALRESPONSE: 0

## 2021-08-31 ASSESSMENT — PAIN - FUNCTIONAL ASSESSMENT: PAIN_FUNCTIONAL_ASSESSMENT: 0-10

## 2021-08-31 NOTE — PROGRESS NOTES
Ambulatory Surgery/Procedure Discharge Note    Vitals:    08/31/21 0819   BP: 106/65   Pulse: 79   Resp: 16   Temp: 96.2 °F (35.7 °C)   SpO2: 100%       In: 500 [I.V.:500]  Out: -     Restroom use offered before discharge. Yes  Pt tolerating PO well and denies nausea. Denies to use the restroom. Discharge instructions were read at bedside. Pain assessment:  none  Pain Level: 0        Patient discharged to home/self care.  Patient discharged via wheel chair by this nurse to waiting family/S.O.       8/31/2021 9:00 AM

## 2021-08-31 NOTE — PROCEDURES
Colonoscopy REPORT    Patient:  Darlean Fothergill                  1970    Referring Physician:  Balaji Patel    Endoscopist: Jennifer     Indication:  Screening - average risk     Medications:  MAC      Pre-Anesthesia Assessment:  I have reviewed and am in agreement with patient history and medication, including previous response to sedation. Prior to the procedure, a History and Physical was performed, and patient medications and allergies were reviewed. The risks and benefits of the procedure and the sedation options and risks were discussed with the patient. Complications included but were not limited to infection, bleeding, perforation, death, and missed lesions. All questions were answered and informed consent was obtained by the patient. Patient identification and proposed procedure were verified by the physician and the nurse in the pre-procedure area and in the procedure room. Mallampatti: I  ASA Grade Assessment: 1    After reviewing the risks and benefits, the patient was deemed in satisfactory condition to undergo the procedure. The anesthesia plan was to use MAC sedation. Immediately prior to administration of medications, the patient was re-assessed for adequacy to receive sedatives and a time out was performed. Patient and healthcare providers were in agreement it was the correct patient and procedure. The heart rate, respiratory rate, oxygen saturations, blood pressure, adequacy of pulmonary ventilation, and response to care were monitored throughout the procedure. The physical status of the patient was re-assessed after the procedure. After adequate sedation was achieved in stepwise fashion a rectal examination was performed and was normal.     The pediatric colonoscope was then advanced atraumatically into the rectum and advanced without difficulty to the cecum. The cecum was identified by the appendiceal orifice the ileocecal valve and other normal anatomy and a picture was obtained. The scope was then withdrawn (>6minutes) with close inspection of the mucosa in a circumferential manor. TI normal.     Retroflexed views under the ICV and of the right colon obtained. 1mm descending polyp removed with forceps. Retroflexed views of the rectum normal.     No immediate complications. The preparation was excellent. Estimated blood loss none    Impression:  1 polyp removed  Plan:  The patient is aware it is their responsibility to call for biopsy results in 7 days. Repeat colonoscopy in 5 years.

## 2021-08-31 NOTE — H&P
Pre-operative History and Physical    Patient: Billy Vitale  : 1970     History Obtained From:  patient, electronic medical record    HISTORY OF PRESENT ILLNESS:    The patient is a 45 y.o. female who presents for a colonoscopy for screening. Past Medical History:        Diagnosis Date    Cancer [facility])     Melanoma    History of melanoma     Thyroid nodule      Past Surgical History:        Procedure Laterality Date     SECTION      MOHS SURGERY  2019    Left nasolabial fold    PLANTAR FASCIA SURGERY Right 13    ENDOSCOPIC PLANTAR FASCIOTOMY RIGHT FOOT    SKIN CANCER DESTRUCTION      WISDOM TOOTH EXTRACTION  age [age]     Medications Prior to Admission:   No current facility-administered medications on file prior to encounter. Current Outpatient Medications on File Prior to Encounter   Medication Sig Dispense Refill    Cholecalciferol (VITAMIN D) 125 MCG (5000 UT) CAPS Take by mouth       Omega-3 Fatty Acids (FISH OIL) 1000 MG CAPS Take 3,000 mg by mouth 3 times daily      calcium carbonate (OSCAL) 500 MG TABS tablet Take 500 mg by mouth daily      cycloSPORINE (RESTASIS) 0.05 % ophthalmic emulsion Place 1 drop into both eyes 2 times daily.  clobetasol (TEMOVATE) 0.05 % external solution Apply to affected area BID PRN flares 50 mL 2     Allergies:  Patient has no known allergies. History of allergic reaction to anesthesia:  No    Social History:   TOBACCO:   reports that she has never smoked. She has never used smokeless tobacco.  ETOH:   reports no history of alcohol use. DRUGS:   reports no history of drug use.   Family History:       Problem Relation Age of Onset    High Blood Pressure Mother     High Blood Pressure Father     Kidney Disease Father         ESRD    Cancer Father         renal    Diabetes Maternal Grandmother     Cancer Maternal Grandfather     Cancer Paternal Grandfather        PHYSICAL EXAM:      /66   Pulse 76   Temp 96.8 °F (36 °C) (Temporal)   Resp 15   LMP 05/01/2017   SpO2 99%  I        Heart:  No m/r/g +s1/s2 RRR    Lungs:  CTA bilaterally    Abdomen:  Soft, nontender, non distended; +bs    ASA Grade:  ASA 1 - Normal health patient    Mallampati Class:  Class I: Soft palate, uvula, fauces, pillars visible  _____x_____  Class II: Soft palate, uvula, fauces visible  __________   Class III: Soft palate, base of uvula visible  __________  Class IV: Hard palate only visible   __________      ASSESSMENT AND PLAN:    1. Patient is a 45 y.o. female here for colonoscopy with deep sedation  2. Procedure options, risks and benefits reviewed with patient. We specifically discussed that risks include, but are not limited to infection, bleeding, perforation, death, and missed lesions. Patient expresses understanding.

## 2021-08-31 NOTE — ANESTHESIA POSTPROCEDURE EVALUATION
Department of Anesthesiology  Postprocedure Note    Patient: Oleg Valencia  MRN: [id number]  YOB: 1970  Date of evaluation: 8/31/2021  Time:  8:14 AM     Procedure Summary     Date: 08/31/21 Room / Location: [facility]    Anesthesia Start: 8031 Anesthesia Stop: 1211    Procedure: COLONOSCOPY POLYPECTOMY SNARE/COLD BIOPSY (N/A ) Diagnosis:       Colon cancer screening      (Colon cancer screening [Z12.11])    Surgeons: Luis Koch MD Responsible Provider: Douglas Pearce DO    Anesthesia Type: MAC ASA Status: 1          Anesthesia Type: MAC    Denny Phase I: Denny Score: 10    Denny Phase II: Denny Score: 9    Last vitals: Reviewed and per EMR flowsheets.        Anesthesia Post Evaluation    Patient location during evaluation: bedside  Patient participation: complete - patient participated  Level of consciousness: sleepy but conscious  Airway patency: patent  Nausea & Vomiting: no nausea and no vomiting  Complications: no  Cardiovascular status: hemodynamically stable  Respiratory status: acceptable  Hydration status: stable

## 2021-08-31 NOTE — ANESTHESIA PRE PROCEDURE
Department of Anesthesiology  Preprocedure Note       Name:  Ovi Pérez   Age:  45 y.o.  :  1970                                          MRN:  [id number]         Date:  2021      Surgeon: Argentina Mcghee):  John Pearson MD    Procedure: Procedure(s):  COLONOSCOPY    Medications prior to admission:   Prior to Admission medications    Medication Sig Start Date End Date Taking? Authorizing Provider   Cholecalciferol (VITAMIN D) 125 MCG (5000 UT) CAPS Take by mouth    Yes Historical Provider, MD   Omega-3 Fatty Acids (FISH OIL) 1000 MG CAPS Take 3,000 mg by mouth 3 times daily   Yes Historical Provider, MD   calcium carbonate (OSCAL) 500 MG TABS tablet Take 500 mg by mouth daily   Yes Historical Provider, MD   cycloSPORINE (RESTASIS) 0.05 % ophthalmic emulsion Place 1 drop into both eyes 2 times daily.      Yes Historical Provider, MD   clobetasol (TEMOVATE) 0.05 % external solution Apply to affected area BID PRN flares 3/23/21   Traci Ceballos MD       Current medications:    Current Facility-Administered Medications   Medication Dose Route Frequency Provider Last Rate Last Admin    lactated ringers infusion   IntraVENous Continuous Thelma Heaps,  mL/hr at 21 0878 New Bag at 21 0705       Allergies:  No Known Allergies    Problem List:    Patient Active Problem List   Diagnosis Code    Plantar fasciitis of right foot M72.2    History of melanoma Z85.820    Thyroid nodule E04.1    History of ovarian cyst Z87.42    Dyslipidemia E78.5       Past Medical History:        Diagnosis Date    Cancer ([facility] [address] 2005    Melanoma    History of melanoma     Thyroid nodule        Past Surgical History:        Procedure Laterality Date     SECTION      MOHS SURGERY  2019    Left nasolabial fold    PLANTAR FASCIA SURGERY Right 13    ENDOSCOPIC PLANTAR FASCIOTOMY RIGHT FOOT    SKIN CANCER DESTRUCTION      WISDOM TOOTH EXTRACTION  age [age]       Social History:    Social History     Tobacco Use    Smoking status: Never Smoker    Smokeless tobacco: Never Used   Substance Use Topics    Alcohol use: No                                Counseling given: Not Answered      Vital Signs (Current):   Vitals:    08/31/21 0656   BP: 126/66   Pulse: 76   Resp: 15   Temp: 96.8 °F (36 °C)   TempSrc: Temporal   SpO2: 99%                                              BP Readings from Last 3 Encounters:   08/31/21 126/66   01/12/21 126/76   03/25/19 104/67       NPO Status: Time of last liquid consumption: 0000                        Time of last solid consumption: 1800                        Date of last liquid consumption: 08/31/21                        Date of last solid food consumption: 08/29/21    BMI:   Wt Readings from Last 3 Encounters:   01/12/21 185 lb 3.2 oz (84 kg)   05/22/19 180 lb (81.6 kg)   03/25/19 182 lb (82.6 kg)     There is no height or weight on file to calculate BMI.    CBC:   Lab Results   Component Value Date    WBC 4.6 02/26/2021    RBC 4.45 02/26/2021    HGB 13.4 02/26/2021    HCT 39.5 02/26/2021    MCV 88.8 02/26/2021    RDW 13.2 02/26/2021     02/26/2021       CMP:   Lab Results   Component Value Date     02/26/2021    K 4.1 02/26/2021     02/26/2021    CO2 27 02/26/2021    BUN 12 02/26/2021    CREATININE 0.7 02/26/2021    GFRAA >60 02/26/2021    GFRAA >60 09/27/2012    AGRATIO 1.8 02/26/2021    LABGLOM >60 02/26/2021    GLUCOSE 84 06/25/2021    PROT 6.6 02/26/2021    PROT 6.8 09/27/2012    CALCIUM 9.4 02/26/2021    BILITOT 0.4 02/26/2021    ALKPHOS 66 02/26/2021    AST 14 02/26/2021    ALT 13 02/26/2021       POC Tests: No results for input(s): POCGLU, POCNA, POCK, POCCL, POCBUN, POCHEMO, POCHCT in the last 72 hours.     Coags: No results found for: PROTIME, INR, APTT    HCG (If Applicable):   Lab Results   Component Value Date    PREGTESTUR Neg 05/24/2013        ABGs: No results found for: PHART, PO2ART, SCL1YOE, SFQ7NDI, BEART, P1JZNMLM     Type & Screen (If Applicable):  No results found for: LABABO, LABRH    Drug/Infectious Status (If Applicable):  No results found for: HIV, HEPCAB    COVID-19 Screening (If Applicable):   Lab Results   Component Value Date    COVID19 NOT DETECTED 12/16/2020           Anesthesia Evaluation  Patient summary reviewed and Nursing notes reviewed no history of anesthetic complications:   Airway: Mallampati: I  TM distance: <3 FB   Neck ROM: full  Mouth opening: > = 3 FB Dental: normal exam         Pulmonary:Negative Pulmonary ROS and normal exam  breath sounds clear to auscultation                             Cardiovascular:  Exercise tolerance: good (>4 METS),         NYHA Classification: I    Rhythm: regular  Rate: normal                    Neuro/Psych:   Negative Neuro/Psych ROS              GI/Hepatic/Renal: Neg GI/Hepatic/Renal ROS            Endo/Other: Negative Endo/Other ROS                    Abdominal:             Vascular: negative vascular ROS. Other Findings:             Anesthesia Plan      MAC     ASA 1       Induction: intravenous. Anesthetic plan and risks discussed with patient. Plan discussed with CRNA.     Attending anesthesiologist reviewed and agrees with Preprocedure content              Papito Blake DO   8/31/2021

## 2021-11-12 ENCOUNTER — HOSPITAL ENCOUNTER (OUTPATIENT)
Dept: ULTRASOUND IMAGING | Age: 51
Discharge: HOME | End: 2021-11-12
Payer: COMMERCIAL

## 2021-11-12 DIAGNOSIS — R92.8 ABNORMAL MAMMOGRAM: ICD-10-CM

## 2021-11-12 PROCEDURE — 76641 ULTRASOUND BREAST COMPLETE: CPT

## 2021-12-13 ENCOUNTER — OFFICE VISIT (OUTPATIENT)
Dept: DERMATOLOGY | Age: 51
End: 2021-12-13
Payer: COMMERCIAL

## 2021-12-13 VITALS — TEMPERATURE: 97.3 F

## 2021-12-13 DIAGNOSIS — L81.4 LENTIGINES: ICD-10-CM

## 2021-12-13 DIAGNOSIS — D22.9 MULTIPLE NEVI: ICD-10-CM

## 2021-12-13 DIAGNOSIS — Z86.018 HISTORY OF DYSPLASTIC NEVUS: ICD-10-CM

## 2021-12-13 DIAGNOSIS — Z85.820 HISTORY OF MALIGNANT MELANOMA: ICD-10-CM

## 2021-12-13 DIAGNOSIS — L21.9 SEBORRHEIC DERMATITIS: ICD-10-CM

## 2021-12-13 DIAGNOSIS — Z85.828 HISTORY OF NONMELANOMA SKIN CANCER: Primary | ICD-10-CM

## 2021-12-13 PROCEDURE — 99214 OFFICE O/P EST MOD 30 MIN: CPT | Performed by: DERMATOLOGY

## 2021-12-13 NOTE — PROGRESS NOTES
[facility]  José Sánchez MD  [phone]      Veverly Sever  1970    45 y.o. Date of Visit: 2021    Chief Complaint: f/u melanoma, moles/lesions  Chief Complaint   Patient presents with    Skin Exam     Last seen: 3-2021  *her daughter Jean-Claude Valdez got engaged in     History of Present Illness:    1a. Hx of melanoma (see below) - no other new c/o; no HA, F/C, no NS, no weight loss, no LAD. Here for full skin check. 1b. Hx of NMSC - s/p Mohs for BCC on the L NL fold - 3-25-19. No signs recurrence. 2. She has multiple nevi - she is here for a full body skin check. She denies having any new lesions. No change in s/s/c in her moles. 3. F/u seb derm/scalp pruritus. No symptoms but has scattered scaly patches on the scalp. Has clobetasol to use as needed for flares. She wears sunscreen regularly. She has gone to [employer] multiple times - careful about sun. S/p excision of an enlarging asx lesion on L buttock at previous visit - read as fibrolipoma. It healed well. Dermatology History   , melanoma left post thigh, excised by plastic surgeon. No sentinel nodes done, no adjuvant therapy. 0.5 mm thick, og 2.     *S/p Mohs for BCC on the L NL fold - 3-25-19    hx dysplastic nevi  No family hx melanoma. Review of Systems:  Gen: no F/C, night sweats, + weight loss previously but intentional  Neuro: no HA  Heme/Lymph: no LAD    Past Medical History, Family History, Surgical History, Medications and Allergies reviewed.     Social History:  Occupation:  RN at [employer]    Past Medical History:   Diagnosis Date    Cancer [facility])     Melanoma    History of melanoma     Thyroid nodule        Past Surgical History:   Procedure Laterality Date     SECTION      COLONOSCOPY N/A 2021    COLONOSCOPY POLYPECTOMY SNARE/COLD BIOPSY performed by Gavi Monroy MD at [place] [address]  2019    Left nasolabial fold    PLANTAR FASCIA SURGERY Right 5/24/13    ENDOSCOPIC PLANTAR FASCIOTOMY RIGHT FOOT    SKIN CANCER DESTRUCTION      WISDOM TOOTH EXTRACTION  age [age]       Outpatient Medications Marked as Taking for the 12/13/21 encounter (Office Visit) with Melissa Klein MD   Medication Sig Dispense Refill    clobetasol (TEMOVATE) 0.05 % external solution Apply to affected area BID PRN flares 50 mL 2    Cholecalciferol (VITAMIN D) 125 MCG (5000 UT) CAPS Take by mouth       Omega-3 Fatty Acids (FISH OIL) 1000 MG CAPS Take 3,000 mg by mouth 3 times daily      calcium carbonate (OSCAL) 500 MG TABS tablet Take 500 mg by mouth daily      cycloSPORINE (RESTASIS) 0.05 % ophthalmic emulsion Place 1 drop into both eyes 2 times daily. No Known Allergies      Physical Examination     Gen, well-appearing  FSE today    1. Scars clear; no LAD  2. trunk and extremities with scattered brown macules and papules   L Buttock with linear pink scar  3. Occipital and focally vertex scalp with scaly pink 1-2 cm patches    Assessment and Plan     1. History of melanoma, no signs of recurrence  Hx of NMSC, no signs recurrence  - educ re si/sx/ABCD's of MM   educ sun protection - OTC sunscreen with SPF 30-50+ recommended and reviewed usage  encouraged skin check yearly (sooner if indicated), self checks     2. Benign-appearing nevi, lentigines and hx of dysplastic nevi  Ed as above       3. Scalp pruritus with mild seb derm versus psoriasismild to focally moderate activity  - clobetasol soln prn flares; ed se/misuse         F/u 6-12 year, sooner prn.

## 2022-03-08 ENCOUNTER — HOSPITAL ENCOUNTER (OUTPATIENT)
Dept: WOMENS IMAGING | Age: 52
Discharge: HOME | End: 2022-03-08
Payer: COMMERCIAL

## 2022-03-08 ENCOUNTER — HOSPITAL ENCOUNTER (OUTPATIENT)
Dept: ULTRASOUND IMAGING | Age: 52
Discharge: HOME | End: 2022-03-08
Payer: COMMERCIAL

## 2022-03-08 ENCOUNTER — HOSPITAL ENCOUNTER (OUTPATIENT)
Dept: GENERAL RADIOLOGY | Age: 52
Discharge: HOME | End: 2022-03-08
Payer: COMMERCIAL

## 2022-03-08 VITALS — BODY MASS INDEX: 34.04 KG/M2 | HEIGHT: 62 IN | WEIGHT: 185 LBS

## 2022-03-08 DIAGNOSIS — Z78.0 POSTMENOPAUSAL: ICD-10-CM

## 2022-03-08 DIAGNOSIS — R92.8 ABNORMAL MAMMOGRAM: ICD-10-CM

## 2022-03-08 DIAGNOSIS — Z09 FOLLOW UP: ICD-10-CM

## 2022-03-08 PROCEDURE — G0279 TOMOSYNTHESIS, MAMMO: HCPCS

## 2022-03-08 PROCEDURE — 77080 DXA BONE DENSITY AXIAL: CPT

## 2022-03-08 PROCEDURE — 76642 ULTRASOUND BREAST LIMITED: CPT

## 2022-03-24 ENCOUNTER — OFFICE VISIT (OUTPATIENT)
Dept: INTERNAL MEDICINE CLINIC | Age: 52
End: 2022-03-24
Payer: COMMERCIAL

## 2022-03-24 VITALS
WEIGHT: 195.4 LBS | OXYGEN SATURATION: 99 % | DIASTOLIC BLOOD PRESSURE: 72 MMHG | RESPIRATION RATE: 14 BRPM | HEART RATE: 88 BPM | BODY MASS INDEX: 35.96 KG/M2 | HEIGHT: 62 IN | SYSTOLIC BLOOD PRESSURE: 122 MMHG

## 2022-03-24 DIAGNOSIS — M85.89 OSTEOPENIA OF MULTIPLE SITES: ICD-10-CM

## 2022-03-24 DIAGNOSIS — Z00.00 ANNUAL PHYSICAL EXAM: Primary | ICD-10-CM

## 2022-03-24 DIAGNOSIS — Z13.220 ENCOUNTER FOR LIPID SCREENING FOR CARDIOVASCULAR DISEASE: ICD-10-CM

## 2022-03-24 DIAGNOSIS — Z13.6 ENCOUNTER FOR LIPID SCREENING FOR CARDIOVASCULAR DISEASE: ICD-10-CM

## 2022-03-24 DIAGNOSIS — Z13.29 SCREENING FOR THYROID DISORDER: ICD-10-CM

## 2022-03-24 DIAGNOSIS — E55.9 VITAMIN D DEFICIENCY: ICD-10-CM

## 2022-03-24 DIAGNOSIS — E04.1 THYROID NODULE: ICD-10-CM

## 2022-03-24 PROCEDURE — 99396 PREV VISIT EST AGE 40-64: CPT | Performed by: INTERNAL MEDICINE

## 2022-03-24 ASSESSMENT — PATIENT HEALTH QUESTIONNAIRE - PHQ9
SUM OF ALL RESPONSES TO PHQ QUESTIONS 1-9: 0
2. FEELING DOWN, DEPRESSED OR HOPELESS: 0
SUM OF ALL RESPONSES TO PHQ QUESTIONS 1-9: 0
1. LITTLE INTEREST OR PLEASURE IN DOING THINGS: 0
SUM OF ALL RESPONSES TO PHQ9 QUESTIONS 1 & 2: 0
SUM OF ALL RESPONSES TO PHQ QUESTIONS 1-9: 0
SUM OF ALL RESPONSES TO PHQ QUESTIONS 1-9: 0

## 2022-03-24 NOTE — PROGRESS NOTES
History and Physical      Gale Del Angel  YOB: 1970    Date of Service:  3/24/2022    Vitals:    22 1455   BP: 122/72   Pulse: 88   Resp: 14   SpO2: 99%   Weight: 195 lb 6.4 oz (88.6 kg)   Height: 5' 2\" (1.575 m)     Body mass index is 35.74 kg/m². Wt Readings from Last 3 Encounters:   22 195 lb 6.4 oz (88.6 kg)   22 185 lb (83.9 kg)   21 185 lb 3.2 oz (84 kg)     BP Readings from Last 3 Encounters:   22 122/72   21 (!) 111/59   21 106/65        Chief Complaint:Gale Del Angel is a 45 y.o. female who presents for complete physical examination. HPI: Overall she is feeling well. Trying to eat healthy and keep walking for exercise. Patient Active Problem List   Diagnosis    Plantar fasciitis of right foot    History of melanoma    Thyroid nodule    History of ovarian cyst    Dyslipidemia       No Known Allergies  Outpatient Medications Marked as Taking for the 3/24/22 encounter (Office Visit) with Itz Escobedo MD   Medication Sig Dispense Refill    Cholecalciferol (VITAMIN D) 125 MCG (5000 UT) CAPS Take by mouth       Omega-3 Fatty Acids (FISH OIL) 1000 MG CAPS Take 3,000 mg by mouth 3 times daily      calcium carbonate (OSCAL) 500 MG TABS tablet Take 500 mg by mouth daily      cycloSPORINE (RESTASIS) 0.05 % ophthalmic emulsion Place 1 drop into both eyes 2 times daily.            Past Medical History:   Diagnosis Date    Cancer [facility])     Melanoma    History of melanoma     Thyroid nodule      Past Surgical History:   Procedure Laterality Date     SECTION      COLONOSCOPY N/A 2021    COLONOSCOPY POLYPECTOMY SNARE/COLD BIOPSY performed by Hari Davey MD at [place] [address]  2019    Left nasolabial fold    PLANTAR FASCIA SURGERY Right 13    ENDOSCOPIC PLANTAR FASCIOTOMY RIGHT FOOT    SKIN CANCER DESTRUCTION      WISDOM TOOTH EXTRACTION  age [age]     Family History   Problem Relation Age of Onset    High Blood Pressure Mother     High Blood Pressure Father     Kidney Disease Father         ESRD    Cancer Father         renal    Diabetes Maternal Grandmother     Cancer Maternal Grandfather     Cancer Paternal Grandfather        Review of Systems:  A comprehensive review of systems was negative except for what was noted in the HPI. Physical Exam  Constitutional:       General: She is not in acute distress. Appearance: Normal appearance. She is not diaphoretic. HENT:      Head: Normocephalic and atraumatic. Right Ear: External ear normal.      Left Ear: External ear normal.      Nose: Nose normal.      Mouth/Throat:      Mouth: Mucous membranes are moist.      Pharynx: Oropharynx is clear. Eyes:      General: No scleral icterus. Conjunctiva/sclera: Conjunctivae normal.   Cardiovascular:      Rate and Rhythm: Normal rate and regular rhythm. Pulses: Normal pulses. Heart sounds: Normal heart sounds. No murmur heard. No friction rub. No gallop. Pulmonary:      Effort: Pulmonary effort is normal.      Breath sounds: Normal breath sounds. No wheezing, rhonchi or rales. Abdominal:      General: Abdomen is flat. Bowel sounds are normal.   Musculoskeletal:         General: No deformity. Cervical back: Normal range of motion. Right lower leg: No edema. Left lower leg: No edema. Skin:     General: Skin is warm and dry. Findings: No rash. Neurological:      General: No focal deficit present. Mental Status: She is alert. Coordination: Coordination normal.      Gait: Gait normal.   Psychiatric:         Mood and Affect: Mood normal.         Behavior: Behavior normal.          Lipid panel:  Lab Results   Component Value Date    CHOL 204 (H) 06/25/2021    TRIG 127 06/25/2021    HDL 46 06/25/2021    LDLCALC 133 (H) 06/25/2021     Glucose:   Glucose (mg/dL)   Date Value   06/25/2021 84       No results found for this visit on 03/24/22. Preventive Care:  Hx abnormal PAP: no  Self-breast exams: yes  Hx of abnormal mammogram: yes - cysts  Previous DEXA scan: yes- 2/22, abnormal: yes  Eye exam within the past 2 years: yes  Dental exam every 6 months: yes  Exercise: walking, 1/2 hour daily  Social History     Socioeconomic History    Marital status:      Spouse name: None    Number of children: 2    Years of education: None    Highest education level: None   Occupational History    None   Tobacco Use    Smoking status: Never Smoker    Smokeless tobacco: Never Used   Vaping Use    Vaping Use: Never used   Substance and Sexual Activity    Alcohol use: No    Drug use: No    Sexual activity: Yes     Partners: Male   Other Topics Concern    None   Social History Narrative    1/12/21    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 Social Determinants of Health     Financial Resource Strain:     Difficulty of Paying Living Expenses: Not on file   Food Insecurity:     Worried About Running Out of Food in the Last Year: Not on file    [place] of Food in the Last Year: Not on file   Transportation Needs:     Lack of Transportation (Medical): Not on file    Lack of Transportation (Non-Medical):  Not on file   Physical Activity:     Days of Exercise per Week: Not on file    Minutes of Exercise per Session: Not on file   Stress:     Feeling of Stress : Not on file   Social Connections:     Frequency of Communication with Friends and Family: Not on file    Frequency of Social Gatherings with Friends and Family: Not on file    Attends Catholic Services: Not on file    Active Member of Clubs or Organizations: Not on file    Attends Club or Organization Meetings: Not on file    Marital Status: Not on file   Intimate Partner Violence:     Fear of Current or Ex-Partner: Not on file    Emotionally Abused: Not on file    Physically Abused: Not on file    Sexually Abused: Not on file   Housing Stability:     Unable to Pay for Housing in the Last Year: Not on file    Number of Places Lived in the Last Year: Not on file    Unstable Housing in the Last Year: Not on file     Social History     Substance and Sexual Activity   Sexual Activity Yes    Partners: Male       Advance Directive: N, <no information>    Immunization History   Administered Date(s) Administered    COVID-19, Moderna, Booster, PF, 50mcg/0.25ml 11/21/2021    COVID-19, Trang Ledesma, Primary or Immunocompromised, PF, 100mcg/0.5mL 01/04/2021, 01/04/2021, 02/01/2021    Hepatitis A Adult (Havrix, Vaqta) 04/29/2019, 10/30/2019    Influenza Vaccine, unspecified formulation 09/29/2016    Influenza Virus Vaccine 09/29/2017, 10/12/2018, 10/01/2019, 10/26/2020    Influenza Whole 10/01/2012    Influenza, Quadv, IM, (6 mo and older Fluzone, Flulaval, Fluarix and 3 yrs and older Afluria) 10/15/2021    Tdap (Boostrix, Adacel) 10/17/2016    Zoster Recombinant (Shingrix) 09/25/2020, 11/28/2020       Health Maintenance   Topic Date Due    Breast cancer screen  03/08/2023    Depression Screen  03/24/2023    Cervical cancer screen  01/18/2026    Lipid screen  06/25/2026    DTaP/Tdap/Td vaccine (2 - Td or Tdap) 10/17/2026    Colorectal Cancer Screen  08/31/2031    Flu vaccine  Completed    Shingles Vaccine  Completed    COVID-19 Vaccine  Completed    Hepatitis C screen  Completed    HIV screen  Completed    Hepatitis A vaccine  Aged Out    Hepatitis B vaccine  Aged Out    Hib vaccine  Aged Out    Meningococcal (ACWY) vaccine  Aged Out    Pneumococcal 0-64 years Vaccine  Aged Out          Assessment/Plan:  1. Annual physical exam  Care gaps identified and addressed  Labs as below including lipids and fasting glucose. UTD on gyn exam  UTD on mammo  UTD on colon cancer screening  UTD on IMZ  UTD on DEXA - taking vitamin d for osteopenia  Following with derm for hx of melanoma. Sun protection  Healthy diet and exercise  -     Lipid Panel;  Future  -     Vitamin D 25 Hydroxy; Future  -     CBC with Auto Differential; Future  -     Comprehensive Metabolic Panel; Future  -     TSH with Reflex; Future  2. Thyroid nodule  Assessment & Plan:  Based on characteristics of nodules no follow up recommended. Will monitor sxs and consider US in future. Orders:  -     TSH with Reflex; Future  3. Screening for thyroid disorder  -     TSH with Reflex; Future  4. Encounter for lipid screening for cardiovascular disease  -     Lipid Panel; Future  5. Vitamin D deficiency  -     Vitamin D 25 Hydroxy; Future  6. Osteopenia of multiple sites  -     Vitamin D 25 Hydroxy; Future  -     CBC with Auto Differential; Future  -     Comprehensive Metabolic Panel;  Future       Return in about 1 year (around 3/24/2023) for Physical.

## 2022-03-29 DIAGNOSIS — M85.89 OSTEOPENIA OF MULTIPLE SITES: ICD-10-CM

## 2022-03-29 DIAGNOSIS — Z13.220 ENCOUNTER FOR LIPID SCREENING FOR CARDIOVASCULAR DISEASE: ICD-10-CM

## 2022-03-29 DIAGNOSIS — E55.9 VITAMIN D DEFICIENCY: ICD-10-CM

## 2022-03-29 DIAGNOSIS — Z00.00 ANNUAL PHYSICAL EXAM: ICD-10-CM

## 2022-03-29 DIAGNOSIS — E04.1 THYROID NODULE: ICD-10-CM

## 2022-03-29 DIAGNOSIS — Z13.6 ENCOUNTER FOR LIPID SCREENING FOR CARDIOVASCULAR DISEASE: ICD-10-CM

## 2022-03-29 DIAGNOSIS — Z13.29 SCREENING FOR THYROID DISORDER: ICD-10-CM

## 2022-03-29 LAB
A/G RATIO: 1.6 (ref 1.1–2.2)
ALBUMIN SERPL-MCNC: 3.9 G/DL (ref 3.4–5)
ALP BLD-CCNC: 62 U/L (ref 40–129)
ALT SERPL-CCNC: 33 U/L (ref 10–40)
ANION GAP SERPL CALCULATED.3IONS-SCNC: 12 MMOL/L (ref 3–16)
AST SERPL-CCNC: 19 U/L (ref 15–37)
BASOPHILS ABSOLUTE: 0 K/UL (ref 0–0.2)
BASOPHILS RELATIVE PERCENT: 0.3 %
BILIRUB SERPL-MCNC: 0.4 MG/DL (ref 0–1)
BUN BLDV-MCNC: 11 MG/DL (ref 7–20)
CALCIUM SERPL-MCNC: 9.2 MG/DL (ref 8.3–10.6)
CHLORIDE BLD-SCNC: 103 MMOL/L (ref 99–110)
CHOLESTEROL, TOTAL: 246 MG/DL (ref 0–199)
CO2: 24 MMOL/L (ref 21–32)
CREAT SERPL-MCNC: 0.6 MG/DL (ref 0.6–1.1)
EOSINOPHILS ABSOLUTE: 0.1 K/UL (ref 0–0.6)
EOSINOPHILS RELATIVE PERCENT: 1.6 %
GFR AFRICAN AMERICAN: >60
GFR NON-AFRICAN AMERICAN: >60
GLUCOSE BLD-MCNC: 91 MG/DL (ref 70–99)
HCT VFR BLD CALC: 40.6 % (ref 36–48)
HDLC SERPL-MCNC: 56 MG/DL (ref 40–60)
HEMOGLOBIN: 13.9 G/DL (ref 12–16)
LDL CHOLESTEROL CALCULATED: 163 MG/DL
LYMPHOCYTES ABSOLUTE: 1.2 K/UL (ref 1–5.1)
LYMPHOCYTES RELATIVE PERCENT: 35.5 %
MCH RBC QN AUTO: 30.3 PG (ref 26–34)
MCHC RBC AUTO-ENTMCNC: 34.2 G/DL (ref 31–36)
MCV RBC AUTO: 88.4 FL (ref 80–100)
MONOCYTES ABSOLUTE: 0.3 K/UL (ref 0–1.3)
MONOCYTES RELATIVE PERCENT: 8.4 %
NEUTROPHILS ABSOLUTE: 1.9 K/UL (ref 1.7–7.7)
NEUTROPHILS RELATIVE PERCENT: 54.2 %
PDW BLD-RTO: 13 % (ref 12.4–15.4)
PLATELET # BLD: 283 K/UL (ref 135–450)
PMV BLD AUTO: 7.9 FL (ref 5–10.5)
POTASSIUM SERPL-SCNC: 4.3 MMOL/L (ref 3.5–5.1)
RBC # BLD: 4.59 M/UL (ref 4–5.2)
SODIUM BLD-SCNC: 139 MMOL/L (ref 136–145)
TOTAL PROTEIN: 6.3 G/DL (ref 6.4–8.2)
TRIGL SERPL-MCNC: 134 MG/DL (ref 0–150)
TSH REFLEX: 2.84 UIU/ML (ref 0.27–4.2)
VITAMIN D 25-HYDROXY: 31.2 NG/ML
VLDLC SERPL CALC-MCNC: 27 MG/DL
WBC # BLD: 3.5 K/UL (ref 4–11)

## 2022-03-30 NOTE — ASSESSMENT & PLAN NOTE
Based on characteristics of nodules no follow up recommended. Will monitor sxs and consider US in future.

## 2022-03-31 ENCOUNTER — PATIENT MESSAGE (OUTPATIENT)
Dept: INTERNAL MEDICINE CLINIC | Age: 52
End: 2022-03-31

## 2022-04-22 NOTE — TELEPHONE ENCOUNTER
From: Hector Enciso  To: Dr. Pérez Alger: 3/31/2022 4:55 PM EDT  Subject: Hector Enciso Be Well    Waist circumference 39

## 2022-06-01 ENCOUNTER — E-VISIT (OUTPATIENT)
Dept: DERMATOLOGY | Age: 52
End: 2022-06-01
Payer: COMMERCIAL

## 2022-06-01 DIAGNOSIS — L25.5 RHUS DERMATITIS: Primary | ICD-10-CM

## 2022-06-01 PROCEDURE — 99422 OL DIG E/M SVC 11-20 MIN: CPT | Performed by: DERMATOLOGY

## 2022-06-01 RX ORDER — PREDNISONE 10 MG/1
TABLET ORAL
Qty: 42 TABLET | Refills: 0 | Status: SHIPPED | OUTPATIENT
Start: 2022-06-01 | End: 2022-06-21

## 2022-06-01 NOTE — PROGRESS NOTES
[facility]  Trevon Sheldon MD  [phone]      Ailyn Holstein  1970    45 y.o. female     Date of Visit: 2022    EVISIT    Chief Complaint: rash    History of Present Illness:    Pruritic rash on the face and arms over the past week. Notes contact with poison ivy prior to onset in E-visit questionnaire. Review of Systems:  Gen: Feels well, good sense of health. No F/C. Skin: No changing moles or lesions. No new rashes. Past Medical History, Family History, Surgical History, Medications and Allergies reviewed. No outpatient medications have been marked as taking for the 22 encounter (E-Visit) with Rosa Elena Mccarty MD.     No Known Allergies    Past Medical History:   Diagnosis Date    Cancer ([facility] [address]     Melanoma    History of melanoma     Thyroid nodule      Past Surgical History:   Procedure Laterality Date     SECTION      COLONOSCOPY N/A 2021    COLONOSCOPY POLYPECTOMY SNARE/COLD BIOPSY performed by Chery Chandler MD at [place] [address]  2019    Left nasolabial fold    PLANTAR FASCIA SURGERY Right 13    ENDOSCOPIC PLANTAR FASCIOTOMY RIGHT FOOT    SKIN CANCER DESTRUCTION      WISDOM TOOTH EXTRACTION  age [age]       Physical Examination     Gen, well-appearing            Assessment and Plan     1. Consistent with acute allergic contact dermatitis from poison ivy  - Start prednisone taper given facial involvement -warning about mood, sleep and appetite changes + blood sugar elevation  - Also send in topical steroid to use twice daily until clear  -Can use a sedating antihistamine at night if needs help with sleep and if tolerates    EV time 11-20 min.

## 2022-09-27 ENCOUNTER — PATIENT MESSAGE (OUTPATIENT)
Dept: DERMATOLOGY | Age: 52
End: 2022-09-27

## 2022-09-27 NOTE — TELEPHONE ENCOUNTER
From: Kelvin Akins  To: Dr. Iain Colindres: 9/27/2022 11:08 AM EDT  Subject: Ear cracked     Left ear has had a crack in the back. Im having a little trouble getting it to heal. Wondering what best treatment suggestion is. Ive been gently washing daily with mild soap in shower then gently drying. Applying mostly Vaseline to keep moist. Have also tried some PSO. Trying not to pick off crusts or touch otherwise. Attaching image. I thought it was better but then came back. No pain just mild twinges so I can tell its there. And of course it gets slightly crusty.

## 2022-10-03 ENCOUNTER — OFFICE VISIT (OUTPATIENT)
Dept: DERMATOLOGY | Age: 52
End: 2022-10-03
Payer: COMMERCIAL

## 2022-10-03 DIAGNOSIS — L81.4 LENTIGINES: ICD-10-CM

## 2022-10-03 DIAGNOSIS — Z85.820 HISTORY OF MALIGNANT MELANOMA: Primary | ICD-10-CM

## 2022-10-03 DIAGNOSIS — D22.9 MULTIPLE NEVI: ICD-10-CM

## 2022-10-03 DIAGNOSIS — L30.9 DERMATITIS: ICD-10-CM

## 2022-10-03 DIAGNOSIS — D23.39 FIBROUS PAPULE OF CHEEK: ICD-10-CM

## 2022-10-03 DIAGNOSIS — Z86.018 HISTORY OF DYSPLASTIC NEVUS: ICD-10-CM

## 2022-10-03 DIAGNOSIS — L21.9 SEBORRHEIC DERMATITIS: ICD-10-CM

## 2022-10-03 PROCEDURE — 99214 OFFICE O/P EST MOD 30 MIN: CPT | Performed by: DERMATOLOGY

## 2022-10-03 NOTE — PROGRESS NOTES
[facility]  Beryle Do, MD  [phone]      Antonio Paris  1970    45 y.o. Date of Visit: 10/3/2022    Chief Complaint: f/u melanoma, moles/lesions  Chief Complaint   Patient presents with    Skin Exam     FSE     HX:NMSC     Last seen:   *daughter Arely Orourke got engaged in 2020    History of Present Illness:    1a. Hx of melanoma (see below) - no other new c/o; no HA, F/C, no weight loss, no LAD. Here for full skin check. 1b. Hx of NMSC - s/p Mohs for BCC on the L NL fold - 3-25-19. No signs recurrence. 2. She has multiple nevi - she is here for a full body skin check. She denies having any new lesions. No change in s/s/c in her moles. 3. F/u seb derm/scalp pruritus. No symptoms but has scattered scaly patches on the scalp. Has clobetasol to use as needed for flares. Has some cracking behind the L ear since September (has happened in the past as well). Using vaseline occasionally. 4/ itching around the ankles recently. Poison ivy earlier this year + bites on the legs. Itching after takes off socks. She wears sunscreen regularly. She has gone to [employer] multiple times - careful about sun. S/p excision of an enlarging asx lesion on L buttock at previous visit - read as fibrolipoma. It healed well. Dermatology History   2003, melanoma left post thigh, excised by plastic surgeon. No sentinel nodes done, no adjuvant therapy. 0.5 mm thick, og 2.     *S/p Mohs for BCC on the L NL fold - 3-25-19    hx dysplastic nevi  No family hx melanoma. Review of Systems:  Gen: no F/C, night sweats, + weight loss previously but intentional  Neuro: no HA  Heme/Lymph: no LAD    Past Medical History, Family History, Surgical History, Medications and Allergies reviewed.     Social History:  Occupation:  RN at [employer]    Past Medical History:   Diagnosis Date    Cancer ([facility] [address] 2005    Melanoma    History of melanoma     Thyroid nodule        Past Surgical History: Procedure Laterality Date     SECTION      COLONOSCOPY N/A 2021    COLONOSCOPY POLYPECTOMY SNARE/COLD BIOPSY performed by Regina Breaux MD at [address] [place] [address] Drive  2019    Left nasolabial fold    PLANTAR FASCIA SURGERY Right 13    ENDOSCOPIC PLANTAR FASCIOTOMY RIGHT FOOT    SKIN CANCER DESTRUCTION      WISDOM TOOTH EXTRACTION  age [age]       Outpatient Medications Marked as Taking for the 10/3/22 encounter (Office Visit) with Ainsley Luis MD   Medication Sig Dispense Refill    triamcinolone (KENALOG) 0.1 % ointment Apply to affected area BID PRN flares 80 g 0    Cholecalciferol (VITAMIN D) 125 MCG (5000 UT) CAPS Take by mouth       Omega-3 Fatty Acids (FISH OIL) 1000 MG CAPS Take 3,000 mg by mouth 3 times daily      calcium carbonate (OSCAL) 500 MG TABS tablet Take 500 mg by mouth daily      cycloSPORINE (RESTASIS) 0.05 % ophthalmic emulsion Place 1 drop into both eyes 2 times daily. No Known Allergies      Physical Examination     Gen, well-appearing  FSE today    1. Scars clear; no LAD  2. trunk and extremities with scattered brown macules and papules   L cheek with tiny dome-shaped skin-colored papule  L Buttock with linear pink scar  3. Occipital scalp with scaly pink 1-2 cm patches  L postauricular area with linear fissure with mild surrounding erythema  4. Ankles with few dry faintly erythematous macules and PIH from previous eruption    Assessment and Plan     1. History of melanoma, no signs of recurrence  Hx of NMSC, no signs recurrence  - Monitor for ABCD's of MM and si/sx of NMSC  Continue sun protection - OTC sunscreen with SPF 30-50+ recommended and reviewed usage  Encouraged skin check in 6-12 mos (sooner if indicated), self checks     2. Benign-appearing nevi, lentigines and hx of dysplastic nevi and tiny FP - L cheek  Ed as above       3.  Scalp pruritus with mild seb derm versus psoriasis-mild to focally moderate activity  - clobetasol soln prn flares; ed se/misuse    3b. L postauricular area - fissure  - OK to use TAC or aquaphor and consider bx if not healing over the next month    4. Mild dermatitis on the ankles  - start TAC bid until clear and prn flares; ed se/misuse     F/u 6-12 year, sooner prn.

## 2023-03-02 ENCOUNTER — HOSPITAL ENCOUNTER (OUTPATIENT)
Dept: WOMENS IMAGING | Age: 53
Discharge: HOME | End: 2023-03-02
Payer: COMMERCIAL

## 2023-03-02 VITALS — BODY MASS INDEX: 34.96 KG/M2 | WEIGHT: 190 LBS | HEIGHT: 62 IN

## 2023-03-02 DIAGNOSIS — Z12.31 ENCOUNTER FOR SCREENING MAMMOGRAM FOR BREAST CANCER: ICD-10-CM

## 2023-03-02 PROCEDURE — 77067 SCR MAMMO BI INCL CAD: CPT

## 2023-06-06 ENCOUNTER — OFFICE VISIT (OUTPATIENT)
Dept: DERMATOLOGY | Age: 53
End: 2023-06-06
Payer: COMMERCIAL

## 2023-06-06 DIAGNOSIS — L21.9 SEBORRHEIC DERMATITIS: ICD-10-CM

## 2023-06-06 DIAGNOSIS — Z85.828 HISTORY OF NONMELANOMA SKIN CANCER: ICD-10-CM

## 2023-06-06 DIAGNOSIS — D23.39 FIBROUS PAPULE OF CHEEK: ICD-10-CM

## 2023-06-06 DIAGNOSIS — L81.4 LENTIGINES: ICD-10-CM

## 2023-06-06 DIAGNOSIS — D22.9 MULTIPLE NEVI: ICD-10-CM

## 2023-06-06 DIAGNOSIS — Z85.820 HISTORY OF MALIGNANT MELANOMA: Primary | ICD-10-CM

## 2023-06-06 DIAGNOSIS — Z86.018 HISTORY OF DYSPLASTIC NEVUS: ICD-10-CM

## 2023-06-06 DIAGNOSIS — B00.1 HERPES LABIALIS: ICD-10-CM

## 2023-06-06 PROCEDURE — 99214 OFFICE O/P EST MOD 30 MIN: CPT | Performed by: DERMATOLOGY

## 2023-06-06 RX ORDER — VALACYCLOVIR HYDROCHLORIDE 1 G/1
TABLET, FILM COATED ORAL
Qty: 12 TABLET | Refills: 1 | Status: SHIPPED | OUTPATIENT
Start: 2023-06-06

## 2023-06-06 NOTE — PROGRESS NOTES
[facility]    Past Medical History:   Diagnosis Date    Cancer ([facility] [address] 2005    Melanoma    History of melanoma     Thyroid nodule        Past Surgical History:   Procedure Laterality Date     SECTION      COLONOSCOPY N/A 2021    COLONOSCOPY POLYPECTOMY SNARE/COLD BIOPSY performed by Sofía Salgado MD at [address] [place] [address] Drive  2019    Left nasolabial fold    PLANTAR FASCIA SURGERY Right 13    ENDOSCOPIC PLANTAR FASCIOTOMY RIGHT FOOT    SKIN CANCER DESTRUCTION      WISDOM TOOTH EXTRACTION  age [age]       Outpatient Medications Marked as Taking for the 23 encounter (Office Visit) with Corrinne Buzzard, MD   Medication Sig Dispense Refill    triamcinolone (KENALOG) 0.1 % ointment Apply to affected area BID PRN flares 80 g 0    Cholecalciferol (VITAMIN D) 125 MCG (5000 UT) CAPS Take by mouth       Omega-3 Fatty Acids (FISH OIL) 1000 MG CAPS Take 3 capsules by mouth 3 times daily      calcium carbonate (OSCAL) 500 MG TABS tablet Take 1 tablet by mouth daily      cycloSPORINE (RESTASIS) 0.05 % ophthalmic emulsion Place 1 drop into both eyes 2 times daily         No Known Allergies      Physical Examination     Gen, well-appearing  FSE today    1. Scars clear; no LAD  2. trunk and extremities with scattered brown macules and papules   L cheek with tiny dome-shaped skin-colored papule  L Buttock with linear pink scar  3. Scap overall clear  L postauricular area clear  4. Ankles clear  5. Perinasal with 1 mm cystic white papule  6. Left lower lip vermilion with crusted erythematous macule    Assessment and Plan     1. History of melanoma, no signs of recurrence  Hx of NMSC, no signs recurrence  - Monitor for ABCD's of MM and si/sx of NMSC  Continue sun protection - OTC sunscreen with SPF 30-50+ recommended and reviewed usage  Encouraged skin check in 6-12 mos (sooner if indicated), self checks     2.  Benign-appearing nevi, lentigines and hx of dysplastic nevi and tiny FP - L

## 2023-06-15 PROBLEM — E66.01 SEVERE OBESITY (BMI 35.0-39.9) WITH COMORBIDITY (HCC): Status: ACTIVE | Noted: 2023-06-15

## 2023-06-20 ENCOUNTER — HOSPITAL ENCOUNTER (OUTPATIENT)
Dept: ULTRASOUND IMAGING | Age: 53
Discharge: HOME | End: 2023-06-20
Payer: COMMERCIAL

## 2023-06-20 DIAGNOSIS — E04.1 THYROID NODULE: ICD-10-CM

## 2023-06-20 PROCEDURE — 76536 US EXAM OF HEAD AND NECK: CPT

## 2023-09-28 ENCOUNTER — PATIENT MESSAGE (OUTPATIENT)
Dept: INTERNAL MEDICINE CLINIC | Age: 53
End: 2023-09-28

## 2023-09-28 DIAGNOSIS — G44.83 COUGH HEADACHE: Primary | ICD-10-CM

## 2023-09-28 DIAGNOSIS — G44.84 EXERTIONAL HEADACHE: ICD-10-CM

## 2023-10-26 ENCOUNTER — HOSPITAL ENCOUNTER (OUTPATIENT)
Dept: MRI IMAGING | Age: 53
Discharge: HOME | End: 2023-10-26
Payer: COMMERCIAL

## 2023-10-26 DIAGNOSIS — G44.84 EXERTIONAL HEADACHE: ICD-10-CM

## 2023-10-26 DIAGNOSIS — G44.83 COUGH HEADACHE: ICD-10-CM

## 2023-10-26 PROCEDURE — A9577 INJ MULTIHANCE: HCPCS | Performed by: INTERNAL MEDICINE

## 2023-10-26 PROCEDURE — 70544 MR ANGIOGRAPHY HEAD W/O DYE: CPT

## 2023-10-26 PROCEDURE — 70553 MRI BRAIN STEM W/O & W/DYE: CPT

## 2023-10-26 PROCEDURE — 6360000004 HC RX CONTRAST MEDICATION: Performed by: INTERNAL MEDICINE

## 2023-10-26 RX ADMIN — GADOBENATE DIMEGLUMINE 19 ML: 529 INJECTION, SOLUTION INTRAVENOUS at 10:22

## 2023-12-15 ENCOUNTER — NURSE TRIAGE (OUTPATIENT)
Dept: OTHER | Facility: CLINIC | Age: 53
End: 2023-12-15

## 2023-12-15 NOTE — TELEPHONE ENCOUNTER
Location of patient: [place]    Received call from [address] [place] [address] at [facility] with Red Flag Complaint. Subjective: Caller states \"having discomfort in chest for a while, feels like it is in her lungs. When breathing will feel tightness. \"     Current Symptoms: First noticed end of summer, thought had a little bug. It went away. Has come back and feels like she notices it more. Its not all the time. Currently feels fine. Its on exhale, will feel tightness  Sometimes will have a dry cough. No wheezing  Will notice it more in the evening. Onset:  intermittent since summer    Associated Symptoms: NA    Pain Severity: denies    Temperature: denies     What has been tried:     LMP: NA Pregnant: NA    Recommended disposition: See in Office Within 2 Weeks    Care advice provided, patient verbalizes understanding; denies any other questions or concerns; instructed to call back for any new or worsening symptoms. Patient/Caller agrees with recommended disposition; writer provided warm transfer to Andreia Spivey at [facility] for appointment scheduling    Attention Provider: Thank you for allowing me to participate in the care of your patient. The patient was connected to triage in response to information provided to the ECC/PSC. Please do not respond through this encounter as the response is not directed to a shared pool.     Reason for Disposition   MILD longstanding difficulty breathing (e.g., speaks in phrases, SOB even at rest, pulse 100-120) and SAME as normal    Protocols used: Breathing Difficulty-ADULT-[place]

## 2023-12-18 DIAGNOSIS — Z13.220 ENCOUNTER FOR LIPID SCREENING FOR CARDIOVASCULAR DISEASE: ICD-10-CM

## 2023-12-18 DIAGNOSIS — Z13.1 SCREENING FOR DIABETES MELLITUS (DM): ICD-10-CM

## 2023-12-18 DIAGNOSIS — Z00.00 ANNUAL PHYSICAL EXAM: ICD-10-CM

## 2023-12-18 DIAGNOSIS — Z13.29 SCREENING FOR THYROID DISORDER: ICD-10-CM

## 2023-12-18 DIAGNOSIS — Z13.6 ENCOUNTER FOR LIPID SCREENING FOR CARDIOVASCULAR DISEASE: ICD-10-CM

## 2023-12-18 DIAGNOSIS — E04.1 THYROID NODULE: ICD-10-CM

## 2023-12-18 DIAGNOSIS — E55.9 VITAMIN D DEFICIENCY: ICD-10-CM

## 2023-12-18 LAB
25(OH)D3 SERPL-MCNC: 35 NG/ML
ALBUMIN SERPL-MCNC: 4.2 G/DL (ref 3.4–5)
ALBUMIN/GLOB SERPL: 1.8 {RATIO} (ref 1.1–2.2)
ALP SERPL-CCNC: 70 U/L (ref 40–129)
ALT SERPL-CCNC: 22 U/L (ref 10–40)
ANION GAP SERPL CALCULATED.3IONS-SCNC: 15 MMOL/L (ref 3–16)
AST SERPL-CCNC: 18 U/L (ref 15–37)
BASOPHILS # BLD: 0 K/UL (ref 0–0.2)
BASOPHILS NFR BLD: 0.9 %
BILIRUB SERPL-MCNC: 0.4 MG/DL (ref 0–1)
BUN SERPL-MCNC: 14 MG/DL (ref 7–20)
CALCIUM SERPL-MCNC: 8.9 MG/DL (ref 8.3–10.6)
CHLORIDE SERPL-SCNC: 103 MMOL/L (ref 99–110)
CHOLEST SERPL-MCNC: 221 MG/DL (ref 0–199)
CO2 SERPL-SCNC: 23 MMOL/L (ref 21–32)
CREAT SERPL-MCNC: 0.7 MG/DL (ref 0.6–1.1)
DEPRECATED RDW RBC AUTO: 13.2 % (ref 12.4–15.4)
EOSINOPHIL # BLD: 0.1 K/UL (ref 0–0.6)
EOSINOPHIL NFR BLD: 2.5 %
GFR SERPLBLD CREATININE-BSD FMLA CKD-EPI: >60 ML/MIN/{1.73_M2}
GLUCOSE SERPL-MCNC: 106 MG/DL (ref 70–99)
HCT VFR BLD AUTO: 41.1 % (ref 36–48)
HDLC SERPL-MCNC: 55 MG/DL (ref 40–60)
HGB BLD-MCNC: 13.9 G/DL (ref 12–16)
LDLC SERPL CALC-MCNC: 145 MG/DL
LYMPHOCYTES # BLD: 1.4 K/UL (ref 1–5.1)
LYMPHOCYTES NFR BLD: 32 %
MCH RBC QN AUTO: 29.9 PG (ref 26–34)
MCHC RBC AUTO-ENTMCNC: 33.7 G/DL (ref 31–36)
MCV RBC AUTO: 88.7 FL (ref 80–100)
MONOCYTES # BLD: 0.4 K/UL (ref 0–1.3)
MONOCYTES NFR BLD: 8.1 %
NEUTROPHILS # BLD: 2.5 K/UL (ref 1.7–7.7)
NEUTROPHILS NFR BLD: 56.5 %
PLATELET # BLD AUTO: 266 K/UL (ref 135–450)
PMV BLD AUTO: 8.7 FL (ref 5–10.5)
POTASSIUM SERPL-SCNC: 4.3 MMOL/L (ref 3.5–5.1)
PROT SERPL-MCNC: 6.6 G/DL (ref 6.4–8.2)
RBC # BLD AUTO: 4.64 M/UL (ref 4–5.2)
SODIUM SERPL-SCNC: 141 MMOL/L (ref 136–145)
TRIGL SERPL-MCNC: 106 MG/DL (ref 0–150)
TSH SERPL DL<=0.005 MIU/L-ACNC: 2.49 UIU/ML (ref 0.27–4.2)
VLDLC SERPL CALC-MCNC: 21 MG/DL
WBC # BLD AUTO: 4.4 K/UL (ref 4–11)

## 2023-12-19 LAB
EST. AVERAGE GLUCOSE BLD GHB EST-MCNC: 111.2 MG/DL
HBA1C MFR BLD: 5.5 %

## 2023-12-29 ENCOUNTER — PATIENT MESSAGE (OUTPATIENT)
Dept: INTERNAL MEDICINE CLINIC | Age: 53
End: 2023-12-29

## 2023-12-29 DIAGNOSIS — B37.0 OROPHARYNGEAL CANDIDIASIS: Primary | ICD-10-CM

## 2023-12-29 NOTE — TELEPHONE ENCOUNTER
From: Jalyn Martini  To: Dr. Zuñiga Fraction: 12/29/2023 2:27 PM EST  Subject: Raspy hoarse voice    Now I am experiencing a raspy, hoarse voice. Any suggestions? Aerochamber?

## 2023-12-30 RX ORDER — FLUCONAZOLE 100 MG/1
TABLET ORAL
Qty: 11 TABLET | Refills: 0 | Status: SHIPPED | OUTPATIENT
Start: 2023-12-30

## 2024-01-15 DIAGNOSIS — R05.3 CHRONIC COUGH: ICD-10-CM

## 2024-01-15 DIAGNOSIS — R07.89 CHEST TIGHTNESS: ICD-10-CM

## 2024-01-15 DIAGNOSIS — R06.01 ORTHOPNEA: ICD-10-CM

## 2024-01-15 RX ORDER — BUDESONIDE AND FORMOTEROL FUMARATE DIHYDRATE 160; 4.5 UG/1; UG/1
2 AEROSOL RESPIRATORY (INHALATION) 2 TIMES DAILY
Qty: 11 G | Refills: 2 | Status: SHIPPED | OUTPATIENT
Start: 2024-01-15

## 2024-01-19 ENCOUNTER — OFFICE VISIT (OUTPATIENT)
Dept: ENT CLINIC | Age: 54
End: 2024-01-19

## 2024-01-19 ENCOUNTER — TELEPHONE (OUTPATIENT)
Dept: INTERNAL MEDICINE CLINIC | Age: 54
End: 2024-01-19

## 2024-01-19 VITALS
DIASTOLIC BLOOD PRESSURE: 67 MMHG | WEIGHT: 200 LBS | HEART RATE: 87 BPM | BODY MASS INDEX: 36.8 KG/M2 | RESPIRATION RATE: 16 BRPM | SYSTOLIC BLOOD PRESSURE: 130 MMHG | HEIGHT: 62 IN | TEMPERATURE: 97.3 F

## 2024-01-19 DIAGNOSIS — K21.9 LARYNGOPHARYNGEAL REFLUX (LPR): ICD-10-CM

## 2024-01-19 DIAGNOSIS — H65.92 FLUID LEVEL BEHIND TYMPANIC MEMBRANE OF LEFT EAR: ICD-10-CM

## 2024-01-19 DIAGNOSIS — H90.12 CONDUCTIVE HEARING LOSS OF LEFT EAR, UNSPECIFIED HEARING STATUS ON CONTRALATERAL SIDE: ICD-10-CM

## 2024-01-19 DIAGNOSIS — R49.0 DYSPHONIA: Primary | ICD-10-CM

## 2024-01-19 DIAGNOSIS — B96.89 BACTERIAL SINUSITIS: ICD-10-CM

## 2024-01-19 DIAGNOSIS — J32.9 BACTERIAL SINUSITIS: ICD-10-CM

## 2024-01-19 RX ORDER — AMOXICILLIN AND CLAVULANATE POTASSIUM 875; 125 MG/1; MG/1
1 TABLET, FILM COATED ORAL 2 TIMES DAILY
Qty: 42 TABLET | Refills: 0 | Status: SHIPPED | OUTPATIENT
Start: 2024-01-19 | End: 2024-02-09

## 2024-01-19 RX ORDER — PREDNISONE 10 MG/1
TABLET ORAL
Qty: 34 TABLET | Refills: 0 | Status: SHIPPED | OUTPATIENT
Start: 2024-01-19

## 2024-01-19 SDOH — ECONOMIC STABILITY: HOUSING INSECURITY
IN THE LAST 12 MONTHS, WAS THERE A TIME WHEN YOU DID NOT HAVE A STEADY PLACE TO SLEEP OR SLEPT IN A SHELTER (INCLUDING NOW)?: NO

## 2024-01-19 SDOH — ECONOMIC STABILITY: TRANSPORTATION INSECURITY
IN THE PAST 12 MONTHS, HAS LACK OF TRANSPORTATION KEPT YOU FROM MEETINGS, WORK, OR FROM GETTING THINGS NEEDED FOR DAILY LIVING?: NO

## 2024-01-19 SDOH — ECONOMIC STABILITY: FOOD INSECURITY: WITHIN THE PAST 12 MONTHS, THE FOOD YOU BOUGHT JUST DIDN'T LAST AND YOU DIDN'T HAVE MONEY TO GET MORE.: NEVER TRUE

## 2024-01-19 SDOH — ECONOMIC STABILITY: FOOD INSECURITY: WITHIN THE PAST 12 MONTHS, YOU WORRIED THAT YOUR FOOD WOULD RUN OUT BEFORE YOU GOT MONEY TO BUY MORE.: NEVER TRUE

## 2024-01-19 SDOH — ECONOMIC STABILITY: INCOME INSECURITY: HOW HARD IS IT FOR YOU TO PAY FOR THE VERY BASICS LIKE FOOD, HOUSING, MEDICAL CARE, AND HEATING?: NOT HARD AT ALL

## 2024-01-19 ASSESSMENT — ENCOUNTER SYMPTOMS
CHOKING: 0
BLOOD IN STOOL: 0
PHOTOPHOBIA: 0
COUGH: 0
RHINORRHEA: 0
DIARRHEA: 0
FACIAL SWELLING: 0
STRIDOR: 0
WHEEZING: 0
TROUBLE SWALLOWING: 0
VOMITING: 0
CONSTIPATION: 0
SINUS PAIN: 0
EYE ITCHING: 0
VOICE CHANGE: 1
BACK PAIN: 0
COLOR CHANGE: 0
SINUS PRESSURE: 0
SHORTNESS OF BREATH: 0
SORE THROAT: 0
EYE DISCHARGE: 0
NAUSEA: 0

## 2024-01-19 NOTE — PROGRESS NOTES
[facility]  [address] , [address]  [place] [address]  P: [phone]  F: [phone]       Patient     Gale Schreiber  1970    ChiefComplaint     Chief Complaint   Patient presents with    Other     Over the passed few years voice has gotten horse        History of Present Illness     Gale Schreiber is a pleasant 53 y.o. female who presents as a new patient for hoarseness, left-sided muffled hearing.  Over the past couple years she has had significant gravelly voice issues.  Nothing has been too severe or consistent.  However, in December she started an oral steroid inhaler for some pulmonary issues.  Immediately after beginning the inhaler, she started developing significant worsening dysphonia.  She noted some white patches on her tongue.  She was placed on a 10-day course of Diflucan.  The voice symptoms seem to improve afterwards.  However, over the past few days she contracted symptoms of a respiratory infection.  She has muffled hearing and pressure in the left ear.  Denies room spinning dizziness.  Denies any history of alcohol or tobacco use.  Denies fevers, chills, night sweats or unexpected weight loss.  Denies cough.  She does have some postnasal drainage.    She denies any significant history of acid reflux or allergic rhinitis symptoms.  Never had surgery on her neck.    Past Medical History     Past Medical History:   Diagnosis Date    Allergic rhinitis     Asthma     Cancer (HCC)     Melanoma    Headache     History of melanoma     Thyroid nodule     Tinnitus        Past Surgical History     Past Surgical History:   Procedure Laterality Date     SECTION      COLONOSCOPY N/A 2021    COLONOSCOPY POLYPECTOMY SNARE/COLD BIOPSY performed by Haseeb Palencia MD at [facility] ENDOSCOPY    MOHS SURGERY  2019    Left nasolabial fold    PLANTAR FASCIA SURGERY Right 2013    ENDOSCOPIC PLANTAR FASCIOTOMY RIGHT FOOT    SKIN CANCER DESTRUCTION      WISDOM

## 2024-01-19 NOTE — TELEPHONE ENCOUNTER
Patient is calling because she has a follow up appointment with  on Monday at 10:30 because she was recently put on Symbicort for suspected asthma.  She was recently out of town and just got back last night but she has developed a really bad cough/coughing up yellowish phlegm.  She does believe she ever had a fever  but did have the chills and congestion as well.  She is wanting to  see if she should be seen sooner or wait until  her Monday appointment?  Will it be ok to  see her in-office on Monday (wear mask/be tested for COVID prior?).  Please call her to discuss her concerns  and how to proceed at number provided.  
Should be seen sooner. Home test for covid. Try to schedule with overflow or virtualist  
Spoke to patient and patient declined to schedule with offsite clinic and vitalist. Per patient she has no fever, no SOB , covid negative and will continue to monitor her lungs and breathing.       
normal (ped)...

## 2024-01-22 ENCOUNTER — HOSPITAL ENCOUNTER (OUTPATIENT)
Dept: PULMONOLOGY | Age: 54
Discharge: HOME | End: 2024-01-22
Payer: COMMERCIAL

## 2024-01-22 ENCOUNTER — HOSPITAL ENCOUNTER (OUTPATIENT)
Dept: NON INVASIVE DIAGNOSTICS | Age: 54
Discharge: HOME | End: 2024-01-22
Payer: COMMERCIAL

## 2024-01-22 ENCOUNTER — OFFICE VISIT (OUTPATIENT)
Dept: INTERNAL MEDICINE CLINIC | Age: 54
End: 2024-01-22
Payer: COMMERCIAL

## 2024-01-22 VITALS
HEART RATE: 76 BPM | TEMPERATURE: 97.6 F | WEIGHT: 200 LBS | HEIGHT: 62 IN | OXYGEN SATURATION: 98 % | DIASTOLIC BLOOD PRESSURE: 78 MMHG | BODY MASS INDEX: 36.8 KG/M2 | SYSTOLIC BLOOD PRESSURE: 122 MMHG

## 2024-01-22 DIAGNOSIS — R07.89 CHEST TIGHTNESS: ICD-10-CM

## 2024-01-22 DIAGNOSIS — R05.3 CHRONIC COUGH: Primary | ICD-10-CM

## 2024-01-22 DIAGNOSIS — R49.0 HOARSE VOICE QUALITY: ICD-10-CM

## 2024-01-22 DIAGNOSIS — R05.3 CHRONIC COUGH: ICD-10-CM

## 2024-01-22 DIAGNOSIS — R06.01 ORTHOPNEA: ICD-10-CM

## 2024-01-22 DIAGNOSIS — E66.01 SEVERE OBESITY (BMI 35.0-39.9) WITH COMORBIDITY (HCC): ICD-10-CM

## 2024-01-22 PROCEDURE — 94060 EVALUATION OF WHEEZING: CPT

## 2024-01-22 PROCEDURE — 94761 N-INVAS EAR/PLS OXIMETRY MLT: CPT

## 2024-01-22 PROCEDURE — 94729 DIFFUSING CAPACITY: CPT

## 2024-01-22 PROCEDURE — 6360000002 HC RX W HCPCS: Performed by: INTERNAL MEDICINE

## 2024-01-22 PROCEDURE — 99214 OFFICE O/P EST MOD 30 MIN: CPT | Performed by: INTERNAL MEDICINE

## 2024-01-22 PROCEDURE — 94664 DEMO&/EVAL PT USE INHALER: CPT

## 2024-01-22 PROCEDURE — 94726 PLETHYSMOGRAPHY LUNG VOLUMES: CPT

## 2024-01-22 PROCEDURE — 93306 TTE W/DOPPLER COMPLETE: CPT

## 2024-01-22 RX ORDER — ALBUTEROL SULFATE 2.5 MG/3ML
2.5 SOLUTION RESPIRATORY (INHALATION) ONCE
Status: COMPLETED | OUTPATIENT
Start: 2024-01-22 | End: 2024-01-22

## 2024-01-22 RX ADMIN — ALBUTEROL SULFATE 2.5 MG: 2.5 SOLUTION RESPIRATORY (INHALATION) at 15:59

## 2024-01-22 ASSESSMENT — PATIENT HEALTH QUESTIONNAIRE - PHQ9
2. FEELING DOWN, DEPRESSED OR HOPELESS: 0
SUM OF ALL RESPONSES TO PHQ QUESTIONS 1-9: 0
SUM OF ALL RESPONSES TO PHQ9 QUESTIONS 1 & 2: 0
1. LITTLE INTEREST OR PLEASURE IN DOING THINGS: 0
SUM OF ALL RESPONSES TO PHQ QUESTIONS 1-9: 0

## 2024-01-22 NOTE — PROGRESS NOTES
Gale Schreiber (:  1970) is a 53 y.o. female,Established patient, here for evaluation of the following chief complaint(s):  Follow-up (Cough and Symbicort follow up./Symbicort has been helping. /) and URI (Went on a trip and brought back a sinus infection. Has been taking prednisone and amoxicillin since 24. )      ASSESSMENT/PLAN:  1. Chronic cough  Comments:  Improved with symbicort. Likely asthma. Getting PFTs this afternoone (on prednisone). Consider methacoline challenge. Continue symbicort for now.  2. Chest tightness  Comments:  Improved with symbicort significantly.  3. Hoarse voice quality  Comments:  Seeing ENT. On prednisone. Monitor.  4. Severe obesity (BMI 35.0-39.9) with comorbidity (HCC)  Assessment & Plan:  Working on healthy diet and weight loss. Has lost 5 lbs at home.        Return in about 3 months (around 2024) for chronic conditions.    SUBJECTIVE/OBJECTIVE:  URI         In general she is feeling better. Her cough and chest tightness had been much better early January until she got sick on . She really did not notice it at all for about 2 weeks. Happy with symbicort. Did have thrush and did a course of diflucan for that.   Also had some cold sores.   She has seen ENT for voice hoarseness that had improved.   He found a sinus infection and some vocal edema and started her on augmentin and prednisone which she started 2 days ago.     Rapid COVID was negative during this illness.     Review of Systems    Current Outpatient Medications on File Prior to Visit   Medication Sig Dispense Refill    amoxicillin-clavulanate (AUGMENTIN) 875-125 MG per tablet Take 1 tablet by mouth 2 times daily for 21 days 42 tablet 0    predniSONE (DELTASONE) 10 MG tablet 4 tablets daily for 7 days, 3 tablets daily for 1 day, 2 tablets daily for 1 day, 1 tablet daily for 1 day 34 tablet 0    budesonide-formoterol (SYMBICORT) 160-4.5 MCG/ACT AERO Inhale 2 puffs by mouth twice daily 11 g 2

## 2024-01-23 PROBLEM — R07.89 CHEST TIGHTNESS: Status: ACTIVE | Noted: 2024-01-23

## 2024-01-24 PROCEDURE — 94729 DIFFUSING CAPACITY: CPT | Performed by: INTERNAL MEDICINE

## 2024-01-24 PROCEDURE — 94060 EVALUATION OF WHEEZING: CPT | Performed by: INTERNAL MEDICINE

## 2024-01-24 PROCEDURE — 94726 PLETHYSMOGRAPHY LUNG VOLUMES: CPT | Performed by: INTERNAL MEDICINE

## 2024-01-24 NOTE — PROCEDURES
[place] [id number]                               PULMONARY FUNCTION    PATIENT NAME: FAUSTINA RIVERA                       :        1970  MED REC NO:   [id number]                          ROOM:  ACCOUNT NO:   [id number]                           ADMIT DATE: 2024  PROVIDER:     Jeremy Rosas MD    DATE OF PROCEDURE:  2024    FINDINGS:  Forced vital capacity, expiratory flow rates, and FEV1 to FVC  ratio are normal.  There is no change after bronchodilator.  Lung volume  determinations by plethysmography show normal total lung capacity, FRC,  RV.  Single-breath diffusion capacity is normal.    IMPRESSION:  Normal pulmonary function study.        JEREMY ROSAS MD    D: 2024 14:13:59       T: 2024 14:16:37     [email or web address]  Job#: [id number]     Doc#: [id number]    CC:

## 2024-02-15 DIAGNOSIS — R05.3 CHRONIC COUGH: ICD-10-CM

## 2024-02-15 DIAGNOSIS — R06.01 ORTHOPNEA: ICD-10-CM

## 2024-02-15 DIAGNOSIS — R07.89 CHEST TIGHTNESS: ICD-10-CM

## 2024-02-15 RX ORDER — BUDESONIDE AND FORMOTEROL FUMARATE DIHYDRATE 160; 4.5 UG/1; UG/1
2 AEROSOL RESPIRATORY (INHALATION) 2 TIMES DAILY
Qty: 11 G | Refills: 5 | Status: SHIPPED | OUTPATIENT
Start: 2024-02-15

## 2024-02-15 NOTE — TELEPHONE ENCOUNTER
Medication:   Requested Prescriptions     Pending Prescriptions Disp Refills    budesonide-formoterol (SYMBICORT) 160-4.5 MCG/ACT AERO 11 g 2     Sig: Inhale 2 puffs into the lungs 2 times daily        Last Filled:  1/15/2024  Insurance is requesting 90 day supply I adjusted rf to 3      Patient Phone Number: [phone] (home)     Last appt: 1/22/2024   Next appt: 4/23/2024

## 2024-02-23 ENCOUNTER — OFFICE VISIT (OUTPATIENT)
Dept: ENT CLINIC | Age: 54
End: 2024-02-23

## 2024-02-23 VITALS
HEART RATE: 80 BPM | RESPIRATION RATE: 16 BRPM | HEIGHT: 62 IN | SYSTOLIC BLOOD PRESSURE: 133 MMHG | TEMPERATURE: 97.3 F | DIASTOLIC BLOOD PRESSURE: 73 MMHG | WEIGHT: 205 LBS | BODY MASS INDEX: 37.73 KG/M2

## 2024-02-23 DIAGNOSIS — R49.0 DYSPHONIA: Primary | ICD-10-CM

## 2024-02-23 DIAGNOSIS — H90.12 CONDUCTIVE HEARING LOSS OF LEFT EAR, UNSPECIFIED HEARING STATUS ON CONTRALATERAL SIDE: ICD-10-CM

## 2024-02-23 DIAGNOSIS — H65.92 FLUID LEVEL BEHIND TYMPANIC MEMBRANE OF LEFT EAR: ICD-10-CM

## 2024-02-23 DIAGNOSIS — J32.9 BACTERIAL SINUSITIS: ICD-10-CM

## 2024-02-23 DIAGNOSIS — K21.9 LARYNGOPHARYNGEAL REFLUX (LPR): ICD-10-CM

## 2024-02-23 DIAGNOSIS — B96.89 BACTERIAL SINUSITIS: ICD-10-CM

## 2024-02-23 RX ORDER — PANTOPRAZOLE SODIUM 40 MG/1
40 TABLET, DELAYED RELEASE ORAL DAILY
Qty: 30 TABLET | Refills: 2 | Status: SHIPPED | OUTPATIENT
Start: 2024-02-23

## 2024-02-23 ASSESSMENT — ENCOUNTER SYMPTOMS
COLOR CHANGE: 0
VOICE CHANGE: 1
RHINORRHEA: 0
EYE REDNESS: 0
SHORTNESS OF BREATH: 0
SINUS PAIN: 0
EYE PAIN: 0
SINUS PRESSURE: 0
CHOKING: 0
SORE THROAT: 0
TROUBLE SWALLOWING: 0
NAUSEA: 0
STRIDOR: 0
EYE ITCHING: 0
PHOTOPHOBIA: 0
COUGH: 0
FACIAL SWELLING: 0
DIARRHEA: 0

## 2024-02-23 NOTE — PROGRESS NOTES
budesonide-formoterol (SYMBICORT) 160-4.5 MCG/ACT AERO Inhale 2 puffs into the lungs 2 times daily 11 g 5    Cholecalciferol (VITAMIN D) 125 MCG (5000 UT) CAPS Take by mouth       Omega-3 Fatty Acids (FISH OIL) 1000 MG CAPS Take 3 capsules by mouth 3 times daily      calcium carbonate (OSCAL) 500 MG TABS tablet Take 1 tablet by mouth daily      cycloSPORINE (RESTASIS) 0.05 % ophthalmic emulsion Place 1 drop into both eyes 2 times daily       No current facility-administered medications for this visit.       Review of Systems     Review of Systems   Constitutional:  Negative for chills, fatigue and fever.   HENT:  Positive for voice change. Negative for congestion, ear discharge, ear pain, facial swelling, hearing loss, nosebleeds, postnasal drip, rhinorrhea, sinus pressure, sinus pain, sneezing, sore throat, tinnitus and trouble swallowing.    Eyes:  Negative for photophobia, pain, redness, itching and visual disturbance.   Respiratory:  Negative for cough, choking, shortness of breath and stridor.    Gastrointestinal:  Negative for diarrhea and nausea.   Musculoskeletal:  Negative for neck pain and neck stiffness.   Skin:  Negative for color change and rash.   Neurological:  Negative for dizziness, facial asymmetry and light-headedness.   Hematological:  Negative for adenopathy.   Psychiatric/Behavioral:  Negative for agitation and confusion.          PhysicalExam     Vitals:    02/23/24 0922   BP: 133/73   Pulse: 80   Resp: 16   Temp: 97.3 °F (36.3 °C)       Physical Exam  Constitutional:       Appearance: She is well-developed.   HENT:      Head: Normocephalic and atraumatic.      Jaw: No trismus.      Comments: Voice is somewhat gravelly and froggy     Right Ear: Tympanic membrane, ear canal and external ear normal. No drainage. No middle ear effusion. Tympanic membrane is not perforated.      Left Ear: Tympanic membrane, ear canal and external ear normal. No drainage.  No middle ear effusion. Tympanic

## 2024-03-25 DIAGNOSIS — K21.9 LARYNGOPHARYNGEAL REFLUX (LPR): ICD-10-CM

## 2024-03-26 DIAGNOSIS — K21.9 LARYNGOPHARYNGEAL REFLUX (LPR): ICD-10-CM

## 2024-03-26 RX ORDER — PANTOPRAZOLE SODIUM 40 MG/1
40 TABLET, DELAYED RELEASE ORAL DAILY
Qty: 30 TABLET | Refills: 2 | Status: SHIPPED | OUTPATIENT
Start: 2024-03-26 | End: 2024-03-26 | Stop reason: SDUPTHER

## 2024-03-26 RX ORDER — PANTOPRAZOLE SODIUM 40 MG/1
40 TABLET, DELAYED RELEASE ORAL DAILY
Qty: 90 TABLET | Refills: 3 | Status: SHIPPED | OUTPATIENT
Start: 2024-03-26

## 2024-04-18 ENCOUNTER — TELEPHONE (OUTPATIENT)
Dept: ENT CLINIC | Age: 54
End: 2024-04-18

## 2024-04-18 NOTE — TELEPHONE ENCOUNTER
patient called in today to let dr lagunas Know that the RX is working But she now has laryngitis with a cold should she keep her appt for4/19 @9:00 please advise

## 2024-04-23 ENCOUNTER — OFFICE VISIT (OUTPATIENT)
Dept: INTERNAL MEDICINE CLINIC | Age: 54
End: 2024-04-23
Payer: COMMERCIAL

## 2024-04-23 VITALS
HEIGHT: 62 IN | HEART RATE: 75 BPM | WEIGHT: 201 LBS | OXYGEN SATURATION: 97 % | SYSTOLIC BLOOD PRESSURE: 114 MMHG | BODY MASS INDEX: 36.99 KG/M2 | DIASTOLIC BLOOD PRESSURE: 78 MMHG

## 2024-04-23 DIAGNOSIS — R07.89 CHEST TIGHTNESS: ICD-10-CM

## 2024-04-23 DIAGNOSIS — Z12.31 ENCOUNTER FOR SCREENING MAMMOGRAM FOR MALIGNANT NEOPLASM OF BREAST: ICD-10-CM

## 2024-04-23 DIAGNOSIS — I34.0 MILD MITRAL REGURGITATION: Primary | ICD-10-CM

## 2024-04-23 PROCEDURE — 99214 OFFICE O/P EST MOD 30 MIN: CPT | Performed by: INTERNAL MEDICINE

## 2024-04-23 NOTE — PROGRESS NOTES
Gale Schreiber (:  1970) is a 53 y.o. female,Established patient, here for evaluation of the following chief complaint(s):  Follow-up      ASSESSMENT/PLAN:  1. Mild mitral regurgitation  Assessment & Plan:  Incidentally discovered on TTE  - mild. Monitor q3-5 years   2. Encounter for screening mammogram for malignant neoplasm of breast  -     CHANCE DIGITAL SCREEN W OR WO CAD BILATERAL; Future  3. Chest tightness  Assessment & Plan:  Discussed utility of methacholine challenge and she would like to proceed with the testing. Normal PFTs.    Orders:  -     Bronchial Challenge; Future      No follow-ups on file.    SUBJECTIVE/OBJECTIVE:  HPI    Overall she is feeling well. Breathing very well on the symbicort for last few months.     Voice is better on pantoprazole.     Review of Systems    Current Outpatient Medications on File Prior to Visit   Medication Sig Dispense Refill    pantoprazole (PROTONIX) 40 MG tablet Take 1 tablet by mouth daily 90 tablet 3    budesonide-formoterol (SYMBICORT) 160-4.5 MCG/ACT AERO Inhale 2 puffs into the lungs 2 times daily 11 g 5    Cholecalciferol (VITAMIN D) 125 MCG (5000 UT) CAPS Take by mouth       Omega-3 Fatty Acids (FISH OIL) 1000 MG CAPS Take 3 capsules by mouth 3 times daily      calcium carbonate (OSCAL) 500 MG TABS tablet Take 1 tablet by mouth daily      cycloSPORINE (RESTASIS) 0.05 % ophthalmic emulsion Place 1 drop into both eyes 2 times daily       No current facility-administered medications on file prior to visit.        Vitals:    24 0957   BP: 114/78   Pulse: 75   SpO2: 97%     Physical Exam  Constitutional:       General: She is not in acute distress.     Appearance: Normal appearance. She is not diaphoretic.   HENT:      Head: Normocephalic and atraumatic.      Right Ear: External ear normal.      Left Ear: External ear normal.      Nose: Nose normal.      Mouth/Throat:      Mouth: Mucous membranes are moist.      Pharynx: Oropharynx is clear.

## 2024-04-23 NOTE — ASSESSMENT & PLAN NOTE
Discussed utility of methacholine challenge and she would like to proceed with the testing. Normal PFTs.

## 2024-05-08 ENCOUNTER — HOSPITAL ENCOUNTER (OUTPATIENT)
Dept: WOMENS IMAGING | Age: 54
Discharge: HOME | End: 2024-05-08
Payer: COMMERCIAL

## 2024-05-08 VITALS — WEIGHT: 201 LBS | BODY MASS INDEX: 36.99 KG/M2 | HEIGHT: 62 IN

## 2024-05-08 DIAGNOSIS — Z12.31 VISIT FOR SCREENING MAMMOGRAM: ICD-10-CM

## 2024-05-08 PROCEDURE — 77063 BREAST TOMOSYNTHESIS BI: CPT

## 2024-05-17 ENCOUNTER — HOSPITAL ENCOUNTER (OUTPATIENT)
Dept: PULMONOLOGY | Age: 54
Discharge: HOME | End: 2024-05-17
Payer: COMMERCIAL

## 2024-05-17 DIAGNOSIS — R07.89 CHEST TIGHTNESS: ICD-10-CM

## 2024-05-17 PROCEDURE — 6370000000 HC RX 637 (ALT 250 FOR IP): Performed by: INTERNAL MEDICINE

## 2024-05-17 PROCEDURE — 94760 N-INVAS EAR/PLS OXIMETRY 1: CPT

## 2024-05-17 PROCEDURE — 6360000002 HC RX W HCPCS: Performed by: INTERNAL MEDICINE

## 2024-05-17 PROCEDURE — 94070 EVALUATION OF WHEEZING: CPT

## 2024-05-17 RX ORDER — ALBUTEROL SULFATE 2.5 MG/3ML
2.5 SOLUTION RESPIRATORY (INHALATION) ONCE
Status: COMPLETED | OUTPATIENT
Start: 2024-05-17 | End: 2024-05-17

## 2024-05-17 RX ORDER — SODIUM CHLORIDE FOR INHALATION 0.9 %
3 VIAL, NEBULIZER (ML) INHALATION PRN
Status: DISCONTINUED | OUTPATIENT
Start: 2024-05-17 | End: 2024-05-18 | Stop reason: HOSPADM

## 2024-05-17 RX ADMIN — ALBUTEROL SULFATE 2.5 MG: 2.5 SOLUTION RESPIRATORY (INHALATION) at 10:46

## 2024-05-17 RX ADMIN — METHACHOLINE CHLORIDE 16 MG: 100 POWDER, FOR SOLUTION RESPIRATORY (INHALATION) at 10:38

## 2024-05-17 RX ADMIN — METHACHOLINE CHLORIDE 1 MG: 100 POWDER, FOR SOLUTION RESPIRATORY (INHALATION) at 10:30

## 2024-05-17 RX ADMIN — METHACHOLINE CHLORIDE 4 MG: 100 POWDER, FOR SOLUTION RESPIRATORY (INHALATION) at 10:34

## 2024-05-17 RX ADMIN — METHACHOLINE CHLORIDE 0.06 MG: 100 POWDER, FOR SOLUTION RESPIRATORY (INHALATION) at 10:22

## 2024-05-17 RX ADMIN — Medication 3 ML: at 10:12

## 2024-05-17 RX ADMIN — METHACHOLINE CHLORIDE 0.25 MG: 100 POWDER, FOR SOLUTION RESPIRATORY (INHALATION) at 10:26

## 2024-06-03 ENCOUNTER — OFFICE VISIT (OUTPATIENT)
Dept: DERMATOLOGY | Age: 54
End: 2024-06-03
Payer: COMMERCIAL

## 2024-06-03 DIAGNOSIS — D22.9 MULTIPLE NEVI: ICD-10-CM

## 2024-06-03 DIAGNOSIS — Z86.018 HISTORY OF DYSPLASTIC NEVUS: ICD-10-CM

## 2024-06-03 DIAGNOSIS — Z85.820 HISTORY OF MALIGNANT MELANOMA: Primary | ICD-10-CM

## 2024-06-03 DIAGNOSIS — D23.39 FIBROUS PAPULE OF CHEEK: ICD-10-CM

## 2024-06-03 DIAGNOSIS — B00.1 HERPES LABIALIS: ICD-10-CM

## 2024-06-03 DIAGNOSIS — L30.9 DERMATITIS: ICD-10-CM

## 2024-06-03 DIAGNOSIS — L21.9 SEBORRHEIC DERMATITIS: ICD-10-CM

## 2024-06-03 DIAGNOSIS — L72.0 MILIA: ICD-10-CM

## 2024-06-03 DIAGNOSIS — Z85.828 HISTORY OF NONMELANOMA SKIN CANCER: ICD-10-CM

## 2024-06-03 DIAGNOSIS — L81.4 LENTIGINES: ICD-10-CM

## 2024-06-03 DIAGNOSIS — M71.30 MYXOID CYST: ICD-10-CM

## 2024-06-03 PROCEDURE — 99214 OFFICE O/P EST MOD 30 MIN: CPT | Performed by: DERMATOLOGY

## 2024-06-03 NOTE — PROGRESS NOTES
[facility]  Tia Lino MD  [phone]      Gale Schreiber  1970    53 y.o.      Date of Visit: 6/3/2024    Chief Complaint: f/u melanoma, moles/lesions  Chief Complaint   Patient presents with    Skin Exam     Last seen: 6-2023  *daughter Tia expecting first baby 9-2024 - boy    History of Present Illness:    1a. Hx of melanoma (2003 - see below) - no other new c/o; no HA, F/C, no weight loss, no LAD. Here for full skin check.  1b. Hx of NMSC - s/p Mohs for BCC on the L NL fold - 3-25-19.  No signs recurrence.  2. She has multiple nevi - she is here for a full body skin check. She denies having any new lesions. No change in s/s/c in her moles.   3. F/u seb derm/scalp pruritus.  No symptoms but has scattered mildly scaly patches on the scalp.  Has clobetasol to use as needed for flares. Had some fissuring behind the L ear intermittently but has been clear recently.    4.  F/u dermatitis - itching around the ankles.  Poison ivy last year + bites on the legs.  No c/o currently.  5.  New tiny papule on the L cheek.  Asymptomatic.  6.  F/u cold sore flares on the lips.  Has valtrex to use prn flares.  No current concerns.  7. She notes a small papule on the R middle toe.  Itchy at times.    She wears sunscreen regularly.  Frequent trips to [place] - careful about sun.    S/p excision of an enlarging asx lesion on L buttock at previous visit - read as fibrolipoma.  It healed well.    Dermatology History   2003, melanoma left post thigh, excised by plastic surgeon. No sentinel nodes done, no adjuvant therapy. 0.5 mm thick, og 2.     *S/p Mohs for BCC on the L NL fold - 3-25-19    hx dysplastic nevi  No family hx melanoma.    Review of Systems:  Gen: no F/C, night sweats, + weight loss previously but intentional  Neuro: no HA  Heme/Lymph: no LAD    Past Medical History, Family History, Surgical History, Medications and Allergies reviewed.    Social History:  Occupation:  RN at [employer]

## 2024-06-04 ENCOUNTER — OFFICE VISIT (OUTPATIENT)
Dept: PRIMARY CARE CLINIC | Age: 54
End: 2024-06-04
Payer: COMMERCIAL

## 2024-06-04 VITALS
DIASTOLIC BLOOD PRESSURE: 84 MMHG | OXYGEN SATURATION: 99 % | SYSTOLIC BLOOD PRESSURE: 132 MMHG | WEIGHT: 200 LBS | BODY MASS INDEX: 36.8 KG/M2 | HEIGHT: 62 IN | HEART RATE: 76 BPM | TEMPERATURE: 97.7 F

## 2024-06-04 DIAGNOSIS — H04.129 DRY EYE: ICD-10-CM

## 2024-06-04 DIAGNOSIS — E66.01 SEVERE OBESITY (BMI 35.0-39.9) WITH COMORBIDITY (HCC): ICD-10-CM

## 2024-06-04 DIAGNOSIS — R05.3 CHRONIC COUGH: Primary | ICD-10-CM

## 2024-06-04 DIAGNOSIS — E78.5 DYSLIPIDEMIA: ICD-10-CM

## 2024-06-04 DIAGNOSIS — E04.1 THYROID NODULE: ICD-10-CM

## 2024-06-04 DIAGNOSIS — Z85.820 HISTORY OF MELANOMA: Chronic | ICD-10-CM

## 2024-06-04 DIAGNOSIS — Z87.42 HISTORY OF OVARIAN CYST: ICD-10-CM

## 2024-06-04 DIAGNOSIS — K58.2 IRRITABLE BOWEL SYNDROME WITH BOTH CONSTIPATION AND DIARRHEA: ICD-10-CM

## 2024-06-04 DIAGNOSIS — J30.1 SEASONAL ALLERGIC RHINITIS DUE TO POLLEN: ICD-10-CM

## 2024-06-04 DIAGNOSIS — I34.0 MILD MITRAL REGURGITATION: ICD-10-CM

## 2024-06-04 DIAGNOSIS — I31.39 PERICARDIAL EFFUSION: ICD-10-CM

## 2024-06-04 PROCEDURE — 99204 OFFICE O/P NEW MOD 45 MIN: CPT | Performed by: STUDENT IN AN ORGANIZED HEALTH CARE EDUCATION/TRAINING PROGRAM

## 2024-06-04 SDOH — HEALTH STABILITY: PHYSICAL HEALTH: ON AVERAGE, HOW MANY DAYS PER WEEK DO YOU ENGAGE IN MODERATE TO STRENUOUS EXERCISE (LIKE A BRISK WALK)?: 2 DAYS

## 2024-06-04 SDOH — HEALTH STABILITY: PHYSICAL HEALTH: ON AVERAGE, HOW MANY MINUTES DO YOU ENGAGE IN EXERCISE AT THIS LEVEL?: 20 MIN

## 2024-06-04 ASSESSMENT — PATIENT HEALTH QUESTIONNAIRE - PHQ9
SUM OF ALL RESPONSES TO PHQ QUESTIONS 1-9: 0
SUM OF ALL RESPONSES TO PHQ9 QUESTIONS 1 & 2: 0
2. FEELING DOWN, DEPRESSED OR HOPELESS: NOT AT ALL
SUM OF ALL RESPONSES TO PHQ QUESTIONS 1-9: 0
1. LITTLE INTEREST OR PLEASURE IN DOING THINGS: NOT AT ALL
SUM OF ALL RESPONSES TO PHQ QUESTIONS 1-9: 0
SUM OF ALL RESPONSES TO PHQ QUESTIONS 1-9: 0

## 2024-06-04 NOTE — PROGRESS NOTES
scleral icterus.        Right eye: No discharge.         Left eye: No discharge.      Extraocular Movements: Extraocular movements intact.      Conjunctiva/sclera: Conjunctivae normal.      Pupils: Pupils are equal, round, and reactive to light.   Cardiovascular:      Rate and Rhythm: Normal rate and regular rhythm.      Heart sounds: Normal heart sounds. No murmur heard.  Pulmonary:      Effort: Pulmonary effort is normal. No respiratory distress.      Breath sounds: Normal breath sounds. No wheezing or rales.   Abdominal:      General: Abdomen is flat. There is no distension.      Palpations: Abdomen is soft. There is no mass.      Tenderness: There is no abdominal tenderness. There is no guarding or rebound.      Hernia: No hernia is present.   Musculoskeletal:      Cervical back: Normal range of motion.   Lymphadenopathy:      Cervical: No cervical adenopathy.   Skin:     General: Skin is warm and dry.      Findings: No erythema, lesion or rash.   Neurological:      General: No focal deficit present.      Mental Status: She is alert and oriented to person, place, and time.   Psychiatric:         Mood and Affect: Mood normal.         Behavior: Behavior normal.         Thought Content: Thought content normal.         Judgment: Judgment normal.         Immunization History   Administered Date(s) Administered    COVID-19, MODERNA BLUE border, Primary or Immunocompromised, (age 12y+), IM, 100 mcg/0.5mL 01/04/2021, 01/04/2021, 02/01/2021    COVID-19, MODERNA Bivalent, (age 12y+), IM, 50 mcg/0.5 mL 10/14/2022    COVID-19, MODERNA Booster BLUE border, (age 18y+), IM, 50mcg/0.25mL 11/21/2021    COVID-19, PFIZER, (2023-24 formula), (age 12y+), IM, 30mcg/0.3mL 10/19/2023    Hep A, HAVRIX, VAQTA, (age 19y+), IM, 1mL 04/29/2019, 10/30/2019    Influenza A (J0Q7-48) Vaccine PF IM 11/09/2009    Influenza Vaccine, unspecified formulation 09/29/2016    Influenza Virus Vaccine 09/29/2017, 10/12/2018, 10/01/2019, 10/26/2020,

## 2024-07-16 ENCOUNTER — OFFICE VISIT (OUTPATIENT)
Dept: PRIMARY CARE CLINIC | Age: 54
End: 2024-07-16
Payer: COMMERCIAL

## 2024-07-16 VITALS
TEMPERATURE: 97.5 F | WEIGHT: 196.2 LBS | SYSTOLIC BLOOD PRESSURE: 128 MMHG | BODY MASS INDEX: 35.89 KG/M2 | HEART RATE: 76 BPM | DIASTOLIC BLOOD PRESSURE: 86 MMHG | OXYGEN SATURATION: 98 %

## 2024-07-16 DIAGNOSIS — J45.991 COUGH VARIANT ASTHMA: Primary | ICD-10-CM

## 2024-07-16 DIAGNOSIS — R06.09 DYSPNEA ON EXERTION: ICD-10-CM

## 2024-07-16 DIAGNOSIS — R06.01 ORTHOPNEA: ICD-10-CM

## 2024-07-16 PROCEDURE — 99214 OFFICE O/P EST MOD 30 MIN: CPT | Performed by: STUDENT IN AN ORGANIZED HEALTH CARE EDUCATION/TRAINING PROGRAM

## 2024-07-16 RX ORDER — BUDESONIDE AND FORMOTEROL FUMARATE DIHYDRATE 80; 4.5 UG/1; UG/1
1 AEROSOL RESPIRATORY (INHALATION) 2 TIMES DAILY
Qty: 10.2 G | Refills: 0 | Status: SHIPPED | OUTPATIENT
Start: 2024-07-16

## 2024-07-16 ASSESSMENT — PATIENT HEALTH QUESTIONNAIRE - PHQ9
SUM OF ALL RESPONSES TO PHQ QUESTIONS 1-9: 0
1. LITTLE INTEREST OR PLEASURE IN DOING THINGS: NOT AT ALL
SUM OF ALL RESPONSES TO PHQ QUESTIONS 1-9: 0
SUM OF ALL RESPONSES TO PHQ9 QUESTIONS 1 & 2: 0
SUM OF ALL RESPONSES TO PHQ QUESTIONS 1-9: 0
SUM OF ALL RESPONSES TO PHQ QUESTIONS 1-9: 0
2. FEELING DOWN, DEPRESSED OR HOPELESS: NOT AT ALL

## 2024-07-16 NOTE — PROGRESS NOTES
[facility]  [facility]  [address]  [address]  [place] [address]  Dept: [phone]  Dept Fax: [phone]  Loc: [phone]      Gale Schreiber is a 53 y.o. female who presents today for:  Chief Complaint   Patient presents with    Asthma     HPI:   Gale Schreiber is 53 y.o.  who presents today for follow up.     Gale Schreiber is:  Personal history: , 2 kids (boy/girl), daughter is  and expecting first baby (boy). 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 Born in [place].     Medical history of:   History of melanoma, seeing Derm; excised from leg 20 years ago and Mohs on face   History of thyroid nodule, ovarian cysts, and cyst on right middle toe   History of HA, worse with exertion, MRI in the past has been normal.   Dry eye - using restasis   IBS - urgency and frequency, better if takes fiber.       Had orthopnea last fall and chest pressure. Had echo, CXR, PFT. Echo showed mitral and tricuspid regurg and trace pericardial effusion (had viral illness prior to echo). Started on Symbicort. Weaned symbicort. Seeing Dr. Mariano.   Started protonix for silent reflux. Stopped it since then.   Had a cold recently and developed laryngitis   Orthopnea resolved.   No chest pain. No FAY. No lower extremity edema.     Today: Restarted symbicort last week and cough goes away and orthopnea resolved as well. No chest pain, Has intentional weight loss. Denies night sweats.     Specialists:   ENT - Dr. Mariano   Derm - Dr. Lino  GYN - Dr. Herminia Trevizo       Preventative Care:    Vaccines - UTD  Colon Ca Screen - 2021, Dr. Palencia  Mammo - 5/9/24  Pap - per GYN        Objective:     Vitals:    07/16/24 0751   BP: 128/86   Site: Right Upper Arm   Position: Sitting   Cuff Size: Medium Adult   Pulse: 76   Temp: 97.5 °F (36.4 °C)   TempSrc: Infrared   SpO2: 98%   Weight: 89 kg (196 lb 3.2 oz)       Wt Readings from Last 3 Encounters:   07/16/24 89 kg (196 lb 3.2 oz)   06/04/24 90.7

## 2024-08-23 DIAGNOSIS — J45.991 COUGH VARIANT ASTHMA: ICD-10-CM

## 2024-08-23 RX ORDER — BUDESONIDE AND FORMOTEROL FUMARATE DIHYDRATE 80; 4.5 UG/1; UG/1
1 AEROSOL RESPIRATORY (INHALATION) 2 TIMES DAILY
Qty: 10.2 G | Refills: 1 | Status: SHIPPED | OUTPATIENT
Start: 2024-08-23

## 2024-08-23 NOTE — TELEPHONE ENCOUNTER
Medication:   Requested Prescriptions     Pending Prescriptions Disp Refills    budesonide-formoterol (SYMBICORT) 80-4.5 MCG/ACT AERO 10.2 g 0     Sig: Inhale 1 puff into the lungs 2 times daily     Last Filled:  7/16/24    Last appt: 7/16/2024   Next appt: Visit date not found    Last OARRS:        No data to display

## 2024-11-08 DIAGNOSIS — J45.991 COUGH VARIANT ASTHMA: ICD-10-CM

## 2024-11-08 RX ORDER — BUDESONIDE AND FORMOTEROL FUMARATE DIHYDRATE 80; 4.5 UG/1; UG/1
2 AEROSOL RESPIRATORY (INHALATION) 2 TIMES DAILY
Qty: 10.2 G | Refills: 1
Start: 2024-11-08

## 2024-11-18 SDOH — HEALTH STABILITY: PHYSICAL HEALTH: ON AVERAGE, HOW MANY MINUTES DO YOU ENGAGE IN EXERCISE AT THIS LEVEL?: 30 MIN

## 2024-11-18 SDOH — HEALTH STABILITY: PHYSICAL HEALTH: ON AVERAGE, HOW MANY DAYS PER WEEK DO YOU ENGAGE IN MODERATE TO STRENUOUS EXERCISE (LIKE A BRISK WALK)?: 2 DAYS

## 2024-11-19 ENCOUNTER — OFFICE VISIT (OUTPATIENT)
Dept: PRIMARY CARE CLINIC | Age: 54
End: 2024-11-19

## 2024-11-19 VITALS
HEART RATE: 76 BPM | WEIGHT: 199.05 LBS | DIASTOLIC BLOOD PRESSURE: 86 MMHG | BODY MASS INDEX: 36.41 KG/M2 | TEMPERATURE: 97.1 F | SYSTOLIC BLOOD PRESSURE: 124 MMHG | OXYGEN SATURATION: 98 %

## 2024-11-19 DIAGNOSIS — J45.991 COUGH VARIANT ASTHMA: Primary | ICD-10-CM

## 2024-11-19 DIAGNOSIS — E04.1 THYROID NODULE: ICD-10-CM

## 2024-11-19 RX ORDER — BUDESONIDE AND FORMOTEROL FUMARATE DIHYDRATE 160; 4.5 UG/1; UG/1
2 AEROSOL RESPIRATORY (INHALATION) 2 TIMES DAILY
Qty: 10.2 G | Refills: 3 | Status: SHIPPED | OUTPATIENT
Start: 2024-11-19

## 2024-11-19 NOTE — PROGRESS NOTES
2024    Gale Schreiber (:  1970) is a 53 y.o. female, here for evaluation of the following medical concerns:    HPI      Last year with Dr Tracy had cough and orthopnea and was dx with cough variant asthma and started on symbicort but was feeling jittery so decreased 1 puff twice daily and did this all summer and was fine, recently started having breathing sx's again and increased to 2 puffs twice daily but not helping so now she is taking 160    Exercise: not a lot but walking  Diet: could be better loves to eat out       Review of Systems   All other systems reviewed and are negative.      Prior to Visit Medications    Medication Sig Taking? Authorizing Provider   budesonide-formoterol (SYMBICORT) 160-4.5 MCG/ACT AERO Inhale 2 puffs into the lungs 2 times daily Yes Avis Bynum MD   cycloSPORINE (RESTASIS) 0.05 % ophthalmic emulsion Place 1 drop into both eyes 2 times daily Yes Thierry Catherine MD   Cholecalciferol (VITAMIN D) 125 MCG (5000 UT) CAPS Take by mouth   Patient not taking: Reported on 2024  Thierry Catherine MD   Omega-3 Fatty Acids (FISH OIL) 1000 MG CAPS Take 3 capsules by mouth 3 times daily  Patient not taking: Reported on 2024  Thierry Catherine MD   calcium carbonate (OSCAL) 500 MG TABS tablet Take 1 tablet by mouth daily  Patient not taking: Reported on 2024  Thierry Catherine MD        No Known Allergies    Past Medical History:   Diagnosis Date    Allergic rhinitis     Asthma     Cancer (HCC)     Headache     History of melanoma     Plantar fasciitis of right foot 2013    Thyroid nodule     Tinnitus        Past Surgical History:   Procedure Laterality Date     SECTION       SECTION      COLONOSCOPY N/A 2021    COLONOSCOPY POLYPECTOMY SNARE/COLD BIOPSY performed by Haseeb Palencia MD at [facility] ENDOSCOPY    MOHS SURGERY  2019    Left nasolabial fold    PLANTAR FASCIA SURGERY Right 2013    ENDOSCOPIC

## 2024-11-22 ENCOUNTER — HOSPITAL ENCOUNTER (OUTPATIENT)
Dept: ULTRASOUND IMAGING | Age: 54
Discharge: HOME | End: 2024-11-22
Payer: COMMERCIAL

## 2024-11-22 DIAGNOSIS — E04.1 THYROID NODULE: ICD-10-CM

## 2024-11-22 PROCEDURE — 76536 US EXAM OF HEAD AND NECK: CPT

## 2025-02-17 DIAGNOSIS — J45.991 COUGH VARIANT ASTHMA: ICD-10-CM

## 2025-02-17 RX ORDER — BUDESONIDE AND FORMOTEROL FUMARATE DIHYDRATE 160; 4.5 UG/1; UG/1
2 AEROSOL RESPIRATORY (INHALATION) 2 TIMES DAILY
Qty: 10.2 G | Refills: 3 | Status: SHIPPED | OUTPATIENT
Start: 2025-02-17

## 2025-02-17 NOTE — TELEPHONE ENCOUNTER
Medication:   Requested Prescriptions     Pending Prescriptions Disp Refills    budesonide-formoterol (SYMBICORT) 160-4.5 MCG/ACT AERO 10.2 g 3     Sig: Inhale 2 puffs into the lungs 2 times daily     Last Filled:  11/19/2024    Last appt: 11/19/2024   Next appt: Visit date not found    Last OARRS:        No data to display

## 2025-02-19 DIAGNOSIS — J45.991 COUGH VARIANT ASTHMA: ICD-10-CM

## 2025-02-19 RX ORDER — BUDESONIDE AND FORMOTEROL FUMARATE DIHYDRATE 160; 4.5 UG/1; UG/1
2 AEROSOL RESPIRATORY (INHALATION) 2 TIMES DAILY
Qty: 10.2 G | Refills: 3 | OUTPATIENT
Start: 2025-02-19

## 2025-02-24 ENCOUNTER — PATIENT MESSAGE (OUTPATIENT)
Dept: PRIMARY CARE CLINIC | Age: 55
End: 2025-02-24

## 2025-02-24 DIAGNOSIS — J45.991 COUGH VARIANT ASTHMA: ICD-10-CM

## 2025-02-24 RX ORDER — BUDESONIDE AND FORMOTEROL FUMARATE DIHYDRATE 160; 4.5 UG/1; UG/1
2 AEROSOL RESPIRATORY (INHALATION) 2 TIMES DAILY
Qty: 10.2 G | Refills: 3 | Status: SHIPPED | OUTPATIENT
Start: 2025-02-24

## 2025-02-24 NOTE — TELEPHONE ENCOUNTER
Medication:   Requested Prescriptions     Pending Prescriptions Disp Refills    budesonide-formoterol (SYMBICORT) 160-4.5 MCG/ACT AERO 10.2 g 3     Sig: Inhale 2 puffs into the lungs 2 times daily     Last Filled:  2/17/2025  Patient needs 90 day supply      Last appt: 11/19/2024   Next appt: Visit date not found    Last OARRS:        No data to display

## 2025-03-07 DIAGNOSIS — H04.129 DRY EYE: ICD-10-CM

## 2025-03-07 DIAGNOSIS — E78.5 DYSLIPIDEMIA: ICD-10-CM

## 2025-03-07 DIAGNOSIS — I31.39 PERICARDIAL EFFUSION: ICD-10-CM

## 2025-03-07 DIAGNOSIS — E66.01 SEVERE OBESITY (BMI 35.0-39.9) WITH COMORBIDITY: ICD-10-CM

## 2025-03-07 DIAGNOSIS — R05.3 CHRONIC COUGH: ICD-10-CM

## 2025-03-07 LAB
25(OH)D3 SERPL-MCNC: 26.2 NG/ML
ALBUMIN SERPL-MCNC: 4.3 G/DL (ref 3.4–5)
ALBUMIN/GLOB SERPL: 2 {RATIO} (ref 1.1–2.2)
ALP SERPL-CCNC: 64 U/L (ref 40–129)
ALT SERPL-CCNC: 28 U/L (ref 10–40)
ANION GAP SERPL CALCULATED.3IONS-SCNC: 10 MMOL/L (ref 3–16)
AST SERPL-CCNC: 22 U/L (ref 15–37)
BASOPHILS # BLD: 0 K/UL (ref 0–0.2)
BASOPHILS NFR BLD: 0.6 %
BILIRUB SERPL-MCNC: 0.4 MG/DL (ref 0–1)
BUN SERPL-MCNC: 13 MG/DL (ref 7–20)
CALCIUM SERPL-MCNC: 9.2 MG/DL (ref 8.3–10.6)
CHLORIDE SERPL-SCNC: 106 MMOL/L (ref 99–110)
CHOLEST SERPL-MCNC: 223 MG/DL (ref 0–199)
CO2 SERPL-SCNC: 26 MMOL/L (ref 21–32)
CREAT SERPL-MCNC: 0.8 MG/DL (ref 0.6–1.1)
DEPRECATED RDW RBC AUTO: 13.2 % (ref 12.4–15.4)
EOSINOPHIL # BLD: 0.1 K/UL (ref 0–0.6)
EOSINOPHIL NFR BLD: 2 %
EST. AVERAGE GLUCOSE BLD GHB EST-MCNC: 111.2 MG/DL
FOLATE SERPL-MCNC: 11.8 NG/ML (ref 4.78–24.2)
GFR SERPLBLD CREATININE-BSD FMLA CKD-EPI: 87 ML/MIN/{1.73_M2}
GLUCOSE SERPL-MCNC: 92 MG/DL (ref 70–99)
HBA1C MFR BLD: 5.5 %
HCT VFR BLD AUTO: 41.7 % (ref 36–48)
HDLC SERPL-MCNC: 56 MG/DL (ref 40–60)
HGB BLD-MCNC: 14 G/DL (ref 12–16)
LDLC SERPL CALC-MCNC: 146 MG/DL
LYMPHOCYTES # BLD: 1.3 K/UL (ref 1–5.1)
LYMPHOCYTES NFR BLD: 29.5 %
MCH RBC QN AUTO: 29.7 PG (ref 26–34)
MCHC RBC AUTO-ENTMCNC: 33.5 G/DL (ref 31–36)
MCV RBC AUTO: 88.7 FL (ref 80–100)
MONOCYTES # BLD: 0.4 K/UL (ref 0–1.3)
MONOCYTES NFR BLD: 8.3 %
NEUTROPHILS # BLD: 2.7 K/UL (ref 1.7–7.7)
NEUTROPHILS NFR BLD: 59.6 %
PLATELET # BLD AUTO: 249 K/UL (ref 135–450)
PMV BLD AUTO: 8.5 FL (ref 5–10.5)
POTASSIUM SERPL-SCNC: 4.6 MMOL/L (ref 3.5–5.1)
PROT SERPL-MCNC: 6.4 G/DL (ref 6.4–8.2)
RBC # BLD AUTO: 4.7 M/UL (ref 4–5.2)
SODIUM SERPL-SCNC: 142 MMOL/L (ref 136–145)
TRIGL SERPL-MCNC: 103 MG/DL (ref 0–150)
TSH SERPL DL<=0.005 MIU/L-ACNC: 2.82 UIU/ML (ref 0.27–4.2)
VIT B12 SERPL-MCNC: 380 PG/ML (ref 211–911)
VLDLC SERPL CALC-MCNC: 21 MG/DL
WBC # BLD AUTO: 4.4 K/UL (ref 4–11)

## 2025-03-08 LAB
ENA SS-A AB SER IA-ACNC: <0.2 AI (ref 0–0.9)
ENA SS-B AB SER IA-ACNC: <0.2 AI (ref 0–0.9)

## 2025-03-10 ENCOUNTER — RESULTS FOLLOW-UP (OUTPATIENT)
Dept: PRIMARY CARE CLINIC | Age: 55
End: 2025-03-10

## 2025-04-07 ENCOUNTER — OFFICE VISIT (OUTPATIENT)
Dept: PRIMARY CARE CLINIC | Age: 55
End: 2025-04-07
Payer: COMMERCIAL

## 2025-04-07 VITALS
HEART RATE: 76 BPM | TEMPERATURE: 97.7 F | DIASTOLIC BLOOD PRESSURE: 78 MMHG | SYSTOLIC BLOOD PRESSURE: 120 MMHG | OXYGEN SATURATION: 97 % | WEIGHT: 200.6 LBS | BODY MASS INDEX: 36.69 KG/M2

## 2025-04-07 DIAGNOSIS — J45.991 COUGH VARIANT ASTHMA: Primary | ICD-10-CM

## 2025-04-07 PROCEDURE — 99213 OFFICE O/P EST LOW 20 MIN: CPT | Performed by: STUDENT IN AN ORGANIZED HEALTH CARE EDUCATION/TRAINING PROGRAM

## 2025-04-07 SDOH — ECONOMIC STABILITY: INCOME INSECURITY: IN THE LAST 12 MONTHS, WAS THERE A TIME WHEN YOU WERE NOT ABLE TO PAY THE MORTGAGE OR RENT ON TIME?: NO

## 2025-04-07 SDOH — ECONOMIC STABILITY: FOOD INSECURITY: WITHIN THE PAST 12 MONTHS, THE FOOD YOU BOUGHT JUST DIDN'T LAST AND YOU DIDN'T HAVE MONEY TO GET MORE.: NEVER TRUE

## 2025-04-07 SDOH — ECONOMIC STABILITY: FOOD INSECURITY: WITHIN THE PAST 12 MONTHS, YOU WORRIED THAT YOUR FOOD WOULD RUN OUT BEFORE YOU GOT MONEY TO BUY MORE.: NEVER TRUE

## 2025-04-07 SDOH — ECONOMIC STABILITY: TRANSPORTATION INSECURITY
IN THE PAST 12 MONTHS, HAS THE LACK OF TRANSPORTATION KEPT YOU FROM MEDICAL APPOINTMENTS OR FROM GETTING MEDICATIONS?: NO

## 2025-04-07 ASSESSMENT — PATIENT HEALTH QUESTIONNAIRE - PHQ9
2. FEELING DOWN, DEPRESSED OR HOPELESS: NOT AT ALL
2. FEELING DOWN, DEPRESSED OR HOPELESS: NOT AT ALL
SUM OF ALL RESPONSES TO PHQ QUESTIONS 1-9: 0
SUM OF ALL RESPONSES TO PHQ QUESTIONS 1-9: 0
1. LITTLE INTEREST OR PLEASURE IN DOING THINGS: NOT AT ALL
SUM OF ALL RESPONSES TO PHQ9 QUESTIONS 1 & 2: 0
SUM OF ALL RESPONSES TO PHQ QUESTIONS 1-9: 0
1. LITTLE INTEREST OR PLEASURE IN DOING THINGS: NOT AT ALL
SUM OF ALL RESPONSES TO PHQ QUESTIONS 1-9: 0

## 2025-04-07 NOTE — PROGRESS NOTES
Gale Schreiber (:  1970) is a 54 y.o. female,Established patient, here for evaluation of the following chief complaint(s):  Follow-up         Assessment & Plan  Cough variant asthma     Chronic well controlled  Continue current regimen           Return in about 1 year (around 2026) for chronic problems.       Subjective   HPI  Only does 1 puff of the symbicort twice daily and doing well.  No other complaints    Review of Systems   All other systems reviewed and are negative.         Objective   Physical Exam  Constitutional:       Appearance: Normal appearance.   HENT:      Head: Normocephalic and atraumatic.      Nose: Nose normal.      Mouth/Throat:      Mouth: Mucous membranes are moist.   Eyes:      Extraocular Movements: Extraocular movements intact.   Cardiovascular:      Rate and Rhythm: Normal rate and regular rhythm.      Heart sounds: Normal heart sounds. No murmur heard.     No friction rub. No gallop.   Pulmonary:      Effort: Pulmonary effort is normal.      Breath sounds: Normal breath sounds. No wheezing, rhonchi or rales.   Skin:     General: Skin is warm.   Neurological:      General: No focal deficit present.      Mental Status: She is alert.   Psychiatric:         Mood and Affect: Mood normal.                  An electronic signature was used to authenticate this note.    --Avis Bynum MD

## 2025-05-21 ENCOUNTER — HOSPITAL ENCOUNTER (OUTPATIENT)
Dept: WOMENS IMAGING | Age: 55
Discharge: HOME | End: 2025-05-21
Payer: COMMERCIAL

## 2025-05-21 VITALS — BODY MASS INDEX: 36.8 KG/M2 | WEIGHT: 200 LBS | HEIGHT: 62 IN

## 2025-05-21 DIAGNOSIS — Z12.31 ENCOUNTER FOR SCREENING MAMMOGRAM FOR MALIGNANT NEOPLASM OF BREAST: ICD-10-CM

## 2025-05-21 PROCEDURE — 77063 BREAST TOMOSYNTHESIS BI: CPT

## (undated) DEVICE — CANNULA SAMP CO2 AD GRN 7FT CO2 AND 7FT O2 TBNG UNIV CONN

## (undated) DEVICE — FORCEPS BX L240CM JAW DIA2.4MM ORNG L CAP W/ NDL DISP RAD